# Patient Record
Sex: MALE | Race: WHITE | NOT HISPANIC OR LATINO | Employment: UNEMPLOYED
[De-identification: names, ages, dates, MRNs, and addresses within clinical notes are randomized per-mention and may not be internally consistent; named-entity substitution may affect disease eponyms.]

---

## 2020-03-02 ENCOUNTER — HEALTH MAINTENANCE LETTER (OUTPATIENT)
Age: 35
End: 2020-03-02

## 2020-12-20 ENCOUNTER — HEALTH MAINTENANCE LETTER (OUTPATIENT)
Age: 35
End: 2020-12-20

## 2021-04-18 ENCOUNTER — HEALTH MAINTENANCE LETTER (OUTPATIENT)
Age: 36
End: 2021-04-18

## 2021-10-03 ENCOUNTER — HEALTH MAINTENANCE LETTER (OUTPATIENT)
Age: 36
End: 2021-10-03

## 2022-05-14 ENCOUNTER — HEALTH MAINTENANCE LETTER (OUTPATIENT)
Age: 37
End: 2022-05-14

## 2022-09-04 ENCOUNTER — HEALTH MAINTENANCE LETTER (OUTPATIENT)
Age: 37
End: 2022-09-04

## 2023-06-03 ENCOUNTER — HEALTH MAINTENANCE LETTER (OUTPATIENT)
Age: 38
End: 2023-06-03

## 2024-08-15 SDOH — HEALTH STABILITY: PHYSICAL HEALTH: ON AVERAGE, HOW MANY DAYS PER WEEK DO YOU ENGAGE IN MODERATE TO STRENUOUS EXERCISE (LIKE A BRISK WALK)?: 5 DAYS

## 2024-08-15 SDOH — HEALTH STABILITY: PHYSICAL HEALTH: ON AVERAGE, HOW MANY MINUTES DO YOU ENGAGE IN EXERCISE AT THIS LEVEL?: 60 MIN

## 2024-08-15 ASSESSMENT — SOCIAL DETERMINANTS OF HEALTH (SDOH): HOW OFTEN DO YOU GET TOGETHER WITH FRIENDS OR RELATIVES?: ONCE A WEEK

## 2024-08-16 ENCOUNTER — OFFICE VISIT (OUTPATIENT)
Dept: FAMILY MEDICINE | Facility: CLINIC | Age: 39
End: 2024-08-16
Payer: COMMERCIAL

## 2024-08-16 VITALS
BODY MASS INDEX: 29.78 KG/M2 | WEIGHT: 208 LBS | RESPIRATION RATE: 20 BRPM | SYSTOLIC BLOOD PRESSURE: 170 MMHG | HEART RATE: 85 BPM | DIASTOLIC BLOOD PRESSURE: 110 MMHG | OXYGEN SATURATION: 96 % | TEMPERATURE: 98.5 F | HEIGHT: 70 IN

## 2024-08-16 DIAGNOSIS — K21.9 GASTROESOPHAGEAL REFLUX DISEASE, UNSPECIFIED WHETHER ESOPHAGITIS PRESENT: ICD-10-CM

## 2024-08-16 DIAGNOSIS — R03.0 ELEVATED BLOOD PRESSURE READING WITHOUT DIAGNOSIS OF HYPERTENSION: ICD-10-CM

## 2024-08-16 DIAGNOSIS — Z00.00 ROUTINE GENERAL MEDICAL EXAMINATION AT A HEALTH CARE FACILITY: Primary | ICD-10-CM

## 2024-08-16 DIAGNOSIS — Z13.1 SCREENING FOR DIABETES MELLITUS: ICD-10-CM

## 2024-08-16 LAB
ALBUMIN SERPL BCG-MCNC: 4.8 G/DL (ref 3.5–5.2)
ALP SERPL-CCNC: 120 U/L (ref 40–150)
ALT SERPL W P-5'-P-CCNC: 23 U/L (ref 0–70)
ANION GAP SERPL CALCULATED.3IONS-SCNC: 11 MMOL/L (ref 7–15)
AST SERPL W P-5'-P-CCNC: 17 U/L (ref 0–45)
BILIRUB SERPL-MCNC: 0.6 MG/DL
BUN SERPL-MCNC: 16 MG/DL (ref 6–20)
CALCIUM SERPL-MCNC: 9.4 MG/DL (ref 8.8–10.4)
CHLORIDE SERPL-SCNC: 102 MMOL/L (ref 98–107)
CHOLEST SERPL-MCNC: 264 MG/DL
CREAT SERPL-MCNC: 1.42 MG/DL (ref 0.67–1.17)
EGFRCR SERPLBLD CKD-EPI 2021: 64 ML/MIN/1.73M2
ERYTHROCYTE [DISTWIDTH] IN BLOOD BY AUTOMATED COUNT: 13 % (ref 10–15)
FASTING STATUS PATIENT QL REPORTED: NO
FASTING STATUS PATIENT QL REPORTED: NO
GLUCOSE SERPL-MCNC: 107 MG/DL (ref 70–99)
HBA1C MFR BLD: 5.5 % (ref 0–5.6)
HCO3 SERPL-SCNC: 28 MMOL/L (ref 22–29)
HCT VFR BLD AUTO: 50.9 % (ref 40–53)
HDLC SERPL-MCNC: 49 MG/DL
HGB BLD-MCNC: 16.7 G/DL (ref 13.3–17.7)
LDLC SERPL CALC-MCNC: 163 MG/DL
MCH RBC QN AUTO: 27.2 PG (ref 26.5–33)
MCHC RBC AUTO-ENTMCNC: 32.8 G/DL (ref 31.5–36.5)
MCV RBC AUTO: 83 FL (ref 78–100)
NONHDLC SERPL-MCNC: 215 MG/DL
PLATELET # BLD AUTO: 189 10E3/UL (ref 150–450)
POTASSIUM SERPL-SCNC: 3.6 MMOL/L (ref 3.4–5.3)
PROT SERPL-MCNC: 7.9 G/DL (ref 6.4–8.3)
RBC # BLD AUTO: 6.14 10E6/UL (ref 4.4–5.9)
SODIUM SERPL-SCNC: 141 MMOL/L (ref 135–145)
TRIGL SERPL-MCNC: 261 MG/DL
TSH SERPL DL<=0.005 MIU/L-ACNC: 2.68 UIU/ML (ref 0.3–4.2)
WBC # BLD AUTO: 6.2 10E3/UL (ref 4–11)

## 2024-08-16 PROCEDURE — 80061 LIPID PANEL: CPT | Performed by: FAMILY MEDICINE

## 2024-08-16 PROCEDURE — 99385 PREV VISIT NEW AGE 18-39: CPT | Performed by: FAMILY MEDICINE

## 2024-08-16 PROCEDURE — 85027 COMPLETE CBC AUTOMATED: CPT | Performed by: FAMILY MEDICINE

## 2024-08-16 PROCEDURE — 93000 ELECTROCARDIOGRAM COMPLETE: CPT | Performed by: FAMILY MEDICINE

## 2024-08-16 PROCEDURE — 99214 OFFICE O/P EST MOD 30 MIN: CPT | Mod: 25 | Performed by: FAMILY MEDICINE

## 2024-08-16 PROCEDURE — 36415 COLL VENOUS BLD VENIPUNCTURE: CPT | Performed by: FAMILY MEDICINE

## 2024-08-16 PROCEDURE — 83036 HEMOGLOBIN GLYCOSYLATED A1C: CPT | Performed by: FAMILY MEDICINE

## 2024-08-16 PROCEDURE — 84443 ASSAY THYROID STIM HORMONE: CPT | Performed by: FAMILY MEDICINE

## 2024-08-16 PROCEDURE — 80053 COMPREHEN METABOLIC PANEL: CPT | Performed by: FAMILY MEDICINE

## 2024-08-16 RX ORDER — LISINOPRIL 10 MG/1
10 TABLET ORAL DAILY
Qty: 90 TABLET | Refills: 3 | Status: CANCELLED | OUTPATIENT
Start: 2024-08-16

## 2024-08-16 ASSESSMENT — ANXIETY QUESTIONNAIRES
5. BEING SO RESTLESS THAT IT IS HARD TO SIT STILL: NOT AT ALL
7. FEELING AFRAID AS IF SOMETHING AWFUL MIGHT HAPPEN: NEARLY EVERY DAY
GAD7 TOTAL SCORE: 16
6. BECOMING EASILY ANNOYED OR IRRITABLE: SEVERAL DAYS
IF YOU CHECKED OFF ANY PROBLEMS ON THIS QUESTIONNAIRE, HOW DIFFICULT HAVE THESE PROBLEMS MADE IT FOR YOU TO DO YOUR WORK, TAKE CARE OF THINGS AT HOME, OR GET ALONG WITH OTHER PEOPLE: SOMEWHAT DIFFICULT
3. WORRYING TOO MUCH ABOUT DIFFERENT THINGS: NEARLY EVERY DAY
1. FEELING NERVOUS, ANXIOUS, OR ON EDGE: NEARLY EVERY DAY
2. NOT BEING ABLE TO STOP OR CONTROL WORRYING: NEARLY EVERY DAY
GAD7 TOTAL SCORE: 16

## 2024-08-16 ASSESSMENT — PATIENT HEALTH QUESTIONNAIRE - PHQ9
SUM OF ALL RESPONSES TO PHQ QUESTIONS 1-9: 4
5. POOR APPETITE OR OVEREATING: NEARLY EVERY DAY

## 2024-08-16 ASSESSMENT — PAIN SCALES - GENERAL: PAINLEVEL: MODERATE PAIN (4)

## 2024-08-16 NOTE — PROGRESS NOTES
Preventive Care Visit  [facility]  Noah Smith DO, Family Medicine  Aug 16, 2024      Assessment & Plan     Routine general medical examination at a health care facility    Cholesterol: screen today non-fasting.   Diabetes: screen today   Colon Cancer: no family history of early colon cancer.    Tobacco: non-user.     Screening for diabetes mellitus  Screen today.   - Hemoglobin A1c    Elevated blood pressure reading without diagnosis of hypertension  BP elevated 170/110. Reports being anxious here at the Doctor's. Has not been seen for 11 years. No chest pain or shortnes of breath at rest or with activity. No headaches or dizziness.   -- discussed significantly elevated. Wishes to hold off on medications at this time and check his BP at home and follow up in 3-5 days for recheck. Understands the risks of untreated blood pressure. Reasons to present to ED discussed.   -- EKG in clinic shows NSR consider old anterior infarct.   - Lipid panel reflex to direct LDL Non-fasting  - CBC with platelets  - Comprehensive metabolic panel (BMP + Alb, Alk Phos, ALT, AST, Total. Bili, TP)  - TSH with free T4 reflex  - EKG 12-lead complete w/read - Clinics  - UA Macroscopic with reflex to Microscopic and Culture - Lab Collect  - Albumin Random Urine Quantitative with Creat Ratio    Gastroesophageal reflux disease, unspecified whether esophagitis present  No issues currently. Used to have severe reflux and was on PPI and famotidine. This then stopped a couple years ago without changing diet or lifestyle. Concerning and will further evaluate with EGD once BP is better controlled.   - Adult GI  Referral - Procedure Only      Patient has been advised of split billing requirements and indicates understanding: Yes    The risks, benefits and treatment options of prescribed medications or other treatments have been discussed with the patient. The patient verbalized their understanding and should  "call or follow up if no improvement or if they develop further problems.      BMI  Estimated body mass index is 29.55 kg/m  as calculated from the following:    Height as of this encounter: 1.787 m (5' 10.35\").    Weight as of this encounter: 94.3 kg (208 lb).   Weight management plan: Discussed healthy diet and exercise guidelines    Counseling  Appropriate preventive services were addressed with this patient via screening, questionnaire, or discussion as appropriate for fall prevention, nutrition, physical activity, Tobacco-use cessation, social engagement, weight loss and cognition.  Checklist reviewing preventive services available has been given to the patient.  Reviewed patient's diet, addressing concerns and/or questions.         Kameron Suero is a 39 year old, presenting for the following:  Physical and Knee Pain         Health Care Directive  Patient does not have a Health Care Directive or Living Will: Discussed advance care planning with patient; however, patient declined at this time.    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 Has not been evaluated since 2013.       Cholesterol: screen today non-fasting.   Diabetes: screen today   Colon Cancer: no family history of early colon cancer.    Tobacco: non-user.     Elevated blood pressure.   Mets>4   No chest pain or shortness of breath at rest or with activity.   Asymptomatic.   Stress levels are quite high.   Wife diagnosed with colon cancer recently.   Having quite a bit of stress in his life.   No tobacco   Rare to no alcohol.   Caffeine: a few soda's per day   Father with history of CAD and MI       GERD  Used to be on PPI, Famotidine.   Last EGD when age 12.   Used to have real bad acid reflux.   Now no longer having any acid reflux. Has not changed his diet.             8/16/2024     1:23 PM   PHQ-9 SCORE   PHQ-9 Total Score 4           8/16/2024     1:23 PM   AMADO-7 SCORE   Total Score 16           8/16/2024     " 1:23 PM   PEG Score   PEG Total Score 0.33               8/15/2024   General Health   How would you rate your overall physical health? Good   Feel stress (tense, anxious, or unable to sleep) Very much      (!) STRESS CONCERN      8/15/2024   Nutrition   Three or more servings of calcium each day? (!) NO   Diet: Other   If other, please elaborate: No grains, onions, spicy.   How many servings of fruit and vegetables per day? (!) 2-3   How many sweetened beverages each day? (!) 4+            8/15/2024   Exercise   Days per week of moderate/strenous exercise 5 days   Average minutes spent exercising at this level 60 min            8/15/2024   Social Factors   Frequency of gathering with friends or relatives Once a week   Worry food won't last until get money to buy more No   Food not last or not have enough money for food? No   Do you have housing? (Housing is defined as stable permanent housing and does not include staying ouside in a car, in a tent, in an abandoned building, in an overnight shelter, or couch-surfing.) Yes   Are you worried about losing your housing? No   Lack of transportation? No   Unable to get utilities (heat,electricity)? No            8/15/2024   Dental   Dentist two times every year? Yes            8/15/2024   TB Screening   Were you born outside of the [place]? No            Today's PHQ-2 Score:       8/15/2024     1:50 PM   PHQ-2 ( 1999 [employer])   Q1: Little interest or pleasure in doing things 0   Q2: Feeling down, depressed or hopeless 1   PHQ-2 Score 1   Q1: Little interest or pleasure in doing things Not at all   Q2: Feeling down, depressed or hopeless Several days   PHQ-2 Score 1           8/15/2024   Substance Use   Alcohol more than 3/day or more than 7/wk No   Do you use any other substances recreationally? No        Social History     Tobacco Use    Smoking status: Former     Current packs/day: 0.00     Types: Cigarettes     Start date: 4/22/2000     Quit date: 4/22/2003     Years since  "quittin.3    Smokeless tobacco: Never   Vaping Use    Vaping status: Never Used   Substance Use Topics    Alcohol use: Yes     Alcohol/week: 1.7 - 2.5 standard drinks of alcohol     Types: 2 - 3 drink(s) per week    Drug use: No           8/15/2024   STI Screening   New sexual partner(s) since last STI/HIV test? No            8/15/2024   Contraception/Family Planning   Questions about contraception or family planning No           Reviewed and updated as needed this visit by Provider   Tobacco  Allergies  Meds  Problems  Med Hx  Surg Hx  Fam Hx                  Review of Systems  Constitutional, HEENT, cardiovascular, pulmonary, gi and gu systems are negative, except as otherwise noted.     Objective    Exam  BP (!) 170/110   Pulse 85   Temp 98.5  F (36.9  C)   Resp 20   Ht 1.787 m (5' 10.35\")   Wt 94.3 kg (208 lb)   SpO2 96%   BMI 29.55 kg/m     Estimated body mass index is 29.55 kg/m  as calculated from the following:    Height as of this encounter: 1.787 m (5' 10.35\").    Weight as of this encounter: 94.3 kg (208 lb).    Physical Exam  GENERAL: alert and no distress  EYES: Eyes grossly normal to inspection, PERRL and conjunctivae and sclerae normal  HENT: ear canals and TM's normal, nose and mouth without ulcers or lesions  NECK: no adenopathy, no asymmetry, masses, or scars  RESP: lungs clear to auscultation - no rales, rhonchi or wheezes  CV: regular rate and rhythm, normal S1 S2, no S3 or S4, no murmur, click or rub, no peripheral edema  ABDOMEN: soft, nontender, no hepatosplenomegaly, no masses and bowel sounds normal  MS: no gross musculoskeletal defects noted, no edema  SKIN: no suspicious lesions or rashes  NEURO: Normal strength and tone, mentation intact and speech normal  PSYCH: mentation appears normal, affect normal/bright        Signed Electronically by: Noah Smith,     "

## 2024-08-16 NOTE — NURSING NOTE
"Chief Complaint   Patient presents with    Physical    Knee Pain       Initial Ht 1.787 m (5' 10.35\")   Wt 94.3 kg (208 lb)   BMI 29.55 kg/m   Estimated body mass index is 29.55 kg/m  as calculated from the following:    Height as of this encounter: 1.787 m (5' 10.35\").    Weight as of this encounter: 94.3 kg (208 lb).    Patient presents to the clinic using No DME    Is there anyone who you would like to be able to receive your results? No  If yes have patient fill out SINAN      "

## 2024-08-16 NOTE — PATIENT INSTRUCTIONS
Patient Education     Schedule EGD procedure.     Monitor blood pressure at home.   Check labs today.     Follow up with myself early next week.       HOW TO CHECK YOUR BLOOD PRESSURE AT HOME:      Avoid eating, smoking, and exercising for at least 30 minutes before taking a reading.     Be sure you have taken your BP medication at least 2-3 hours before you check it.      Sit quietly for 10 minutes before a reading.      Sit in a chair with your feet flat on the floor. Rest your  arm on a table so that the arm cuff is at the same level as your heart.     Remain still during the reading.     Record your blood pressure and pulse in a log and bring to your next appointment.        Preventive Care Advice   This is general advice given by our system to help you stay healthy. However, your care team may have specific advice just for you. Please talk to your care team about your preventive care needs.  Nutrition  Eat 5 or more servings of fruits and vegetables each day.  Try wheat bread, brown rice and whole grain pasta (instead of white bread, rice, and pasta).  Get enough calcium and vitamin D. Check the label on foods and aim for 100% of the RDA (recommended daily allowance).  Lifestyle  Exercise at least 150 minutes each week  (30 minutes a day, 5 days a week).  Do muscle strengthening activities 2 days a week. These help control your weight and prevent disease.  No smoking.  Wear sunscreen to prevent skin cancer.  Have a dental exam and cleaning every 6 months.  Yearly exams  See your health care team every year to talk about:  Any changes in your health.  Any medicines your care team has prescribed.  Preventive care, family planning, and ways to prevent chronic diseases.  Shots (vaccines)   HPV shots (up to age 26), if you've never had them before.  Hepatitis B shots (up to age 59), if you've never had them before.  COVID-19 shot: Get this shot when it's due.  Flu shot: Get a flu shot every year.  Tetanus shot: Get  a tetanus shot every 10 years.  Pneumococcal, hepatitis A, and RSV shots: Ask your care team if you need these based on your risk.  Shingles shot (for age 50 and up)  General health tests  Diabetes screening:  Starting at age 35, Get screened for diabetes at least every 3 years.  If you are younger than age 35, ask your care team if you should be screened for diabetes.  Cholesterol test: At age 39, start having a cholesterol test every 5 years, or more often if advised.  Bone density scan (DEXA): At age 50, ask your care team if you should have this scan for osteoporosis (brittle bones).  Hepatitis C: Get tested at least once in your life.  STIs (sexually transmitted infections)  Before age 24: Ask your care team if you should be screened for STIs.  After age 24: Get screened for STIs if you're at risk. You are at risk for STIs (including HIV) if:  You are sexually active with more than one person.  You don't use condoms every time.  You or a partner was diagnosed with a sexually transmitted infection.  If you are at risk for HIV, ask about PrEP medicine to prevent HIV.  Get tested for HIV at least once in your life, whether you are at risk for HIV or not.  Cancer screening tests  Cervical cancer screening: If you have a cervix, begin getting regular cervical cancer screening tests starting at age 21.  Breast cancer scan (mammogram): If you've ever had breasts, begin having regular mammograms starting at age 40. This is a scan to check for breast cancer.  Colon cancer screening: It is important to start screening for colon cancer at age 45.  Have a colonoscopy test every 10 years (or more often if you're at risk) Or, ask your provider about stool tests like a FIT test every year or Cologuard test every 3 years.  To learn more about your testing options, visit:   .  For help making a decision, visit:   https://bit.ly/uv74975.  Prostate cancer screening test: If you have a prostate, ask your care team if a prostate  cancer screening test (PSA) at age 55 is right for you.  Lung cancer screening: If you are a current or former smoker ages 50 to 80, ask your care team if ongoing lung cancer screenings are right for you.  For informational purposes only. Not to replace the advice of your health care provider. Copyright   2023 [facility] [employer]. All rights reserved. Clinically reviewed by the [facility]. [employer] 759059 - REV 01/24.  Learning About Stress  What is stress?     Stress is your body's response to a hard situation. Your body can have a physical, emotional, or mental response. Stress is a fact of life for most people, and it affects everyone differently. What causes stress for you may not be stressful for someone else.  A lot of things can cause stress. You may feel stress when you go on a job interview, take a test, or run a race. This kind of short-term stress is normal and even useful. It can help you if you need to work hard or react quickly. For example, stress can help you finish an important job on time.  Long-term stress is caused by ongoing stressful situations or events. Examples of long-term stress include long-term health problems, ongoing problems at work, or conflicts in your family. Long-term stress can harm your health.  How does stress affect your health?  When you are stressed, your body responds as though you are in danger. It makes hormones that speed up your heart, make you breathe faster, and give you a burst of energy. This is called the fight-or-flight stress response. If the stress is over quickly, your body goes back to normal and no harm is done.  But if stress happens too often or lasts too long, it can have bad effects. Long-term stress can make you more likely to get sick, and it can make symptoms of some diseases worse. If you tense up when you are stressed, you may develop neck, shoulder, or low back pain. Stress is linked to high blood pressure and heart  disease.  Stress also harms your emotional health. It can make you hidalgo, tense, or depressed. Your relationships may suffer, and you may not do well at work or school.  What can you do to manage stress?  You can try these things to help manage stress:   Do something active. Exercise or activity can help reduce stress. Walking is a great way to get started. Even everyday activities such as housecleaning or yard work can help.  Try yoga or alejandra chi. These techniques combine exercise and meditation. You may need some training at first to learn them.  Do something you enjoy. For example, listen to music or go to a movie. Practice your hobby or do volunteer work.  Meditate. This can help you relax, because you are not worrying about what happened before or what may happen in the future.  Do guided imagery. Imagine yourself in any setting that helps you feel calm. You can use online videos, books, or a teacher to guide you.  Do breathing exercises. For example:  From a standing position, bend forward from the waist with your knees slightly bent. Let your arms dangle close to the floor.  Breathe in slowly and deeply as you return to a standing position. Roll up slowly and lift your head last.  Hold your breath for just a few seconds in the standing position.  Breathe out slowly and bend forward from the waist.  Let your feelings out. Talk, laugh, cry, and express anger when you need to. Talking with supportive friends or family, a counselor, or a liam leader about your feelings is a healthy way to relieve stress. Avoid discussing your feelings with people who make you feel worse.  Write. It may help to write about things that are bothering you. This helps you find out how much stress you feel and what is causing it. When you know this, you can find better ways to cope.  What can you do to prevent stress?  You might try some of these things to help prevent stress:  Manage your time. This helps you find time to do the  "things you want and need to do.  Get enough sleep. Your body recovers from the stresses of the day while you are sleeping.  Get support. Your family, friends, and community can make a difference in how you experience stress.  Limit your news feed. Avoid or limit time on social media or news that may make you feel stressed.  Do something active. Exercise or activity can help reduce stress. Walking is a great way to get started.  Where can you learn more?  Go to https://www.[employer].net/patiented  Enter N032 in the search box to learn more about \"Learning About Stress.\"  Current as of: October 24, 2023               Content Version: 14.0    5406-9107 [employer].   Care instructions adapted under license by your healthcare professional. If you have questions about a medical condition or this instruction, always ask your healthcare professional. [employer] disclaims any warranty or liability for your use of this information.         "

## 2024-08-18 PROCEDURE — 81003 URINALYSIS AUTO W/O SCOPE: CPT | Performed by: FAMILY MEDICINE

## 2024-08-19 LAB
ALBUMIN UR-MCNC: NEGATIVE MG/DL
APPEARANCE UR: CLEAR
BILIRUB UR QL STRIP: NEGATIVE
COLOR UR AUTO: YELLOW
CREAT UR-MCNC: 69 MG/DL
GLUCOSE UR STRIP-MCNC: NEGATIVE MG/DL
HGB UR QL STRIP: NEGATIVE
KETONES UR STRIP-MCNC: NEGATIVE MG/DL
LEUKOCYTE ESTERASE UR QL STRIP: NEGATIVE
MICROALBUMIN UR-MCNC: <12 MG/L
MICROALBUMIN/CREAT UR: NORMAL MG/G{CREAT}
NITRATE UR QL: NEGATIVE
PH UR STRIP: 6.5 [PH] (ref 5–7)
SP GR UR STRIP: 1.01 (ref 1–1.03)
UROBILINOGEN UR STRIP-ACNC: 0.2 E.U./DL

## 2024-08-19 PROCEDURE — 82043 UR ALBUMIN QUANTITATIVE: CPT | Performed by: FAMILY MEDICINE

## 2024-08-19 PROCEDURE — 82570 ASSAY OF URINE CREATININE: CPT | Performed by: FAMILY MEDICINE

## 2024-08-20 ENCOUNTER — OFFICE VISIT (OUTPATIENT)
Dept: FAMILY MEDICINE | Facility: CLINIC | Age: 39
End: 2024-08-20
Payer: COMMERCIAL

## 2024-08-20 VITALS
HEIGHT: 72 IN | RESPIRATION RATE: 20 BRPM | TEMPERATURE: 97.6 F | SYSTOLIC BLOOD PRESSURE: 180 MMHG | BODY MASS INDEX: 28.04 KG/M2 | HEART RATE: 89 BPM | OXYGEN SATURATION: 100 % | WEIGHT: 207 LBS | DIASTOLIC BLOOD PRESSURE: 110 MMHG

## 2024-08-20 DIAGNOSIS — R79.89 ELEVATED SERUM CREATININE: ICD-10-CM

## 2024-08-20 DIAGNOSIS — R94.31 ABNORMAL ELECTROCARDIOGRAM: ICD-10-CM

## 2024-08-20 DIAGNOSIS — I10 ESSENTIAL HYPERTENSION: Primary | ICD-10-CM

## 2024-08-20 PROCEDURE — 99213 OFFICE O/P EST LOW 20 MIN: CPT | Mod: 25 | Performed by: FAMILY MEDICINE

## 2024-08-20 PROCEDURE — 90471 IMMUNIZATION ADMIN: CPT | Performed by: FAMILY MEDICINE

## 2024-08-20 PROCEDURE — 90715 TDAP VACCINE 7 YRS/> IM: CPT | Performed by: FAMILY MEDICINE

## 2024-08-20 RX ORDER — AMLODIPINE BESYLATE 5 MG/1
5 TABLET ORAL DAILY
Qty: 90 TABLET | Refills: 3 | Status: SHIPPED | OUTPATIENT
Start: 2024-08-20 | End: 2024-09-04

## 2024-08-20 ASSESSMENT — PAIN SCALES - GENERAL: PAINLEVEL: NO PAIN (0)

## 2024-08-20 NOTE — PROGRESS NOTES
Assessment & Plan     Essential hypertension  New diagnosis  Discussed lifestyle modifications.   Currently asymptomatic.   -- start on amlodipine 5 mg daily. Follow up with nurse BP check in 2 weeks. If remains elevated plan to further increase the amlodipine.   -- recheck BMP in 2 weeks.   -- advised to monitor BP at home.   -- reports having quite a bit of stress/ anxiety and feels this is playing a role as well. Not interested in medications at this time.   - amLODIPine (NORVASC) 5 MG tablet  Dispense: 90 tablet; Refill: 3  - Echocardiogram Complete    Elevated serum creatinine   Plan to recheck in 2 weeks. Encouraged staying well hydrated.   - Basic metabolic panel  (Ca, Cl, CO2, Creat, Gluc, K, Na, BUN)    Abnormal electrocardiogram  EKG with consider old anterior infarct. Proceed with ECHO for further evaluation.   - Echocardiogram Complete      The risks, benefits and treatment options of prescribed medications or other treatments have been discussed with the patient. The patient verbalized their understanding and should call or follow up if no improvement or if they develop further problems.      Kameron Suero is a 39 year old, presenting for the following health issues:  Hypertension        8/20/2024     2:12 PM   Additional Questions   Roomed by Nicole MACHADO   Accompanied by wife, Mildred     History of Present Illness       Hypertension: He presents for follow up of hypertension.  He does check blood pressure  regularly outside of the clinic. Outside blood pressures have been over 140/90. He does not follow a low salt diet.     Vascular Disease:  He presents for follow up of vascular disease.     He never takes nitroglycerin. He is not taking daily aspirin.    He eats 2-3 servings of fruits and vegetables daily.He consumes 7 sweetened beverage(s) daily.He exercises with enough effort to increase his heart rate 30 to 60 minutes per day.  He exercises with enough effort to increase his heart rate 7 days per  "week.   He is taking medications regularly.       39 year old male who presents to clinic for follow up on blood pressure.       HTN  Elevated at home as well.   Asymptomatic.   No chest pain or shortness of breath at rest or with activity.   Has strong family history of elevated blood pressure.   Willing to start on a medication.   Initial lab work showing elevated creatinine at 1.42.     Reviewed recent labs and EKG with patient and spouse today in clinic.     Review of Systems  Constitutional, HEENT, cardiovascular, pulmonary, gi and gu systems are negative, except as otherwise noted.      Objective    BP (!) 170/110 (BP Location: Right arm, Patient Position: Chair, Cuff Size: Adult Regular)   Pulse 89   Temp 97.6  F (36.4  C) (Oral)   Resp 20   Ht 1.816 m (5' 11.5\")   Wt 93.9 kg (207 lb)   SpO2 100%   BMI 28.47 kg/m    Body mass index is 28.47 kg/m .  Physical Exam   General: alert, cooperative, no acute distress   CV: RRR, no murmur  Resp: non-labored breathing, clear to auscultation, no wheezing or rales   Abdomen: Soft, non-tender, no guarding.   Extremities: No peripheral edema, calves non-tender.       Signed Electronically by: Noah Smith DO    "

## 2024-08-20 NOTE — PATIENT INSTRUCTIONS
Start on amlodipine 5 mg daily.     Follow up with a nurse BP check in 2 weeks.     Lab visit in 2 weeks.     Schedule ECHO of the heart.       HOW TO CHECK YOUR BLOOD PRESSURE AT HOME:      Avoid eating, smoking, and exercising for at least 30 minutes before taking a reading.     Be sure you have taken your BP medication at least 2-3 hours before you check it.      Sit quietly for 10 minutes before a reading.      Sit in a chair with your feet flat on the floor. Rest your  arm on a table so that the arm cuff is at the same level as your heart.     Remain still during the reading.     Record your blood pressure and pulse in a log and bring to your next appointment.

## 2024-08-20 NOTE — NURSING NOTE
Prior to immunization administration, verified patients identity using patient s name and date of birth. Please see Immunization Activity for additional information.     Screening Questionnaire for Adult Immunization    Are you sick today?   No   Do you have allergies to medications, food, a vaccine component or latex?   No   Have you ever had a serious reaction after receiving a vaccination?   No   Do you have a long-term health problem with heart, lung, kidney, or metabolic disease (e.g., diabetes), asthma, a blood disorder, no spleen, complement component deficiency, a cochlear implant, or a spinal fluid leak?  Are you on long-term aspirin therapy?   No   Do you have cancer, leukemia, HIV/AIDS, or any other immune system problem?   No   Do you have a parent, brother, or sister with an immune system problem?   No   In the past 3 months, have you taken medications that affect  your immune system, such as prednisone, other steroids, or anticancer drugs; drugs for the treatment of rheumatoid arthritis, Crohn s disease, or psoriasis; or have you had radiation treatments?   No   Have you had a seizure, or a brain or other nervous system problem?   No   During the past year, have you received a transfusion of blood or blood    products, or been given immune (gamma) globulin or antiviral drug?   No   For women: Are you pregnant or is there a chance you could become       pregnant during the next month?   No   Have you received any vaccinations in the past 4 weeks?   No     Immunization questionnaire answers were all negative.      Patient instructed to remain in clinic for 15 minutes afterwards, and to report any adverse reactions.     Screening performed by Nicole Whipple CMA on 8/20/2024 at 2:19 PM.

## 2024-09-04 ENCOUNTER — ALLIED HEALTH/NURSE VISIT (OUTPATIENT)
Dept: FAMILY MEDICINE | Facility: CLINIC | Age: 39
End: 2024-09-04
Payer: COMMERCIAL

## 2024-09-04 ENCOUNTER — TELEPHONE (OUTPATIENT)
Dept: FAMILY MEDICINE | Facility: CLINIC | Age: 39
End: 2024-09-04

## 2024-09-04 ENCOUNTER — LAB (OUTPATIENT)
Dept: LAB | Facility: CLINIC | Age: 39
End: 2024-09-04
Payer: COMMERCIAL

## 2024-09-04 VITALS — SYSTOLIC BLOOD PRESSURE: 160 MMHG | DIASTOLIC BLOOD PRESSURE: 104 MMHG | HEART RATE: 80 BPM

## 2024-09-04 DIAGNOSIS — R79.89 ELEVATED SERUM CREATININE: ICD-10-CM

## 2024-09-04 DIAGNOSIS — I10 ESSENTIAL HYPERTENSION: ICD-10-CM

## 2024-09-04 DIAGNOSIS — I10 ESSENTIAL HYPERTENSION: Primary | ICD-10-CM

## 2024-09-04 LAB
ANION GAP SERPL CALCULATED.3IONS-SCNC: 8 MMOL/L (ref 7–15)
BUN SERPL-MCNC: 14.1 MG/DL (ref 6–20)
CALCIUM SERPL-MCNC: 9.3 MG/DL (ref 8.8–10.4)
CHLORIDE SERPL-SCNC: 103 MMOL/L (ref 98–107)
CREAT SERPL-MCNC: 1.33 MG/DL (ref 0.67–1.17)
EGFRCR SERPLBLD CKD-EPI 2021: 70 ML/MIN/1.73M2
GLUCOSE SERPL-MCNC: 137 MG/DL (ref 70–99)
HCO3 SERPL-SCNC: 28 MMOL/L (ref 22–29)
POTASSIUM SERPL-SCNC: 4.1 MMOL/L (ref 3.4–5.3)
SODIUM SERPL-SCNC: 139 MMOL/L (ref 135–145)

## 2024-09-04 PROCEDURE — 36415 COLL VENOUS BLD VENIPUNCTURE: CPT

## 2024-09-04 PROCEDURE — 99207 PR NO CHARGE NURSE ONLY: CPT

## 2024-09-04 PROCEDURE — 80048 BASIC METABOLIC PNL TOTAL CA: CPT

## 2024-09-04 RX ORDER — AMLODIPINE BESYLATE 10 MG/1
10 TABLET ORAL DAILY
Qty: 90 TABLET | Refills: 3 | Status: SHIPPED | OUTPATIENT
Start: 2024-09-04

## 2024-09-04 NOTE — TELEPHONE ENCOUNTER
Pio Reveles is a 39 year old patient who comes in today for a Blood Pressure check because of new medication.  Patient started amlodipine 5 mg recently.   Vital Signs as repeated by RN today:  /114 initially, then 160/104 on recheck.  Pulse 80.  Patient is taking medication as prescribed.  Patient is tolerating medications well.  Patient is monitoring Blood Pressure at home on occasion.  Reading one week ago was 159/114, then yesterday 168/115.   Current complaints: Patient reports occasional lightheadedness with sudden movements, otherwise tolerating medication well with no s/e nor hypertension symptoms. He was advised to get up slowly and avoid sudden, fast movements while adjusting to medication.    Disposition:  Forwarding to PCP to advise on BP and medication please.  Patient also inquiring BP goal for him to be able to have the EGD?    Mony Clifford RN  [facility]

## 2024-09-04 NOTE — TELEPHONE ENCOUNTER
Patient was notified that he should increase his amlodipine to 10 mg daily, per Dr Smith.  Pt verbalized understanding.

## 2024-09-04 NOTE — TELEPHONE ENCOUNTER
BP remains elevated. Plan to increase amlodipine from 5 mg daily to 10 mg daily.     Follow up as scheduled.

## 2024-09-04 NOTE — PROGRESS NOTES
Pio Reveles is a 39 year old patient who comes in today for a Blood Pressure check because of new medication.  Patient started amlodipine 5 mg recently.   Vital Signs as repeated by RN today:  /114 initially, then 160/104 on recheck.  Pulse 80.  Patient is taking medication as prescribed.  Patient is tolerating medications well.  Patient is monitoring Blood Pressure at home on occasion.  Reading one week ago was 159/114, then yesterday 168/115.   Current complaints: Patient reports occasional lightheadedness with sudden movements, otherwise tolerating medication well with no s/e nor hypertension symptoms. He was advised to get up slowly and avoid sudden, fast movements while adjusting to medication.    Disposition:  Forwarding to PCP to advise on BP and medication please.  Patient also inquiring BP goal for him to be able to have the EGD?    Mony Clifford RN  [facility]

## 2024-09-06 ENCOUNTER — HOSPITAL ENCOUNTER (OUTPATIENT)
Dept: CARDIOLOGY | Facility: CLINIC | Age: 39
Discharge: HOME | End: 2024-09-06
Attending: FAMILY MEDICINE | Admitting: FAMILY MEDICINE
Payer: COMMERCIAL

## 2024-09-06 DIAGNOSIS — I10 ESSENTIAL HYPERTENSION: ICD-10-CM

## 2024-09-06 DIAGNOSIS — R94.31 ABNORMAL ELECTROCARDIOGRAM: ICD-10-CM

## 2024-09-06 LAB — BI-PLANE LVEF ECHO: NORMAL

## 2024-09-06 PROCEDURE — 255N000002 HC RX 255 OP 636: Performed by: FAMILY MEDICINE

## 2024-09-06 PROCEDURE — 999N000208 ECHOCARDIOGRAM COMPLETE

## 2024-09-06 PROCEDURE — 93306 TTE W/DOPPLER COMPLETE: CPT | Mod: 26 | Performed by: INTERNAL MEDICINE

## 2024-09-06 RX ADMIN — HUMAN ALBUMIN MICROSPHERES AND PERFLUTREN 2 ML: 10; .22 INJECTION, SOLUTION INTRAVENOUS at 10:14

## 2024-09-20 ENCOUNTER — OFFICE VISIT (OUTPATIENT)
Dept: FAMILY MEDICINE | Facility: CLINIC | Age: 39
End: 2024-09-20
Payer: COMMERCIAL

## 2024-09-20 VITALS
BODY MASS INDEX: 29.18 KG/M2 | DIASTOLIC BLOOD PRESSURE: 112 MMHG | RESPIRATION RATE: 18 BRPM | WEIGHT: 212.2 LBS | TEMPERATURE: 97.5 F | SYSTOLIC BLOOD PRESSURE: 160 MMHG | HEART RATE: 97 BPM | OXYGEN SATURATION: 99 %

## 2024-09-20 DIAGNOSIS — I10 ESSENTIAL HYPERTENSION: Primary | ICD-10-CM

## 2024-09-20 DIAGNOSIS — R79.89 ELEVATED SERUM CREATININE: ICD-10-CM

## 2024-09-20 DIAGNOSIS — R94.31 ABNORMAL ELECTROCARDIOGRAM: ICD-10-CM

## 2024-09-20 LAB
ANION GAP SERPL CALCULATED.3IONS-SCNC: 10 MMOL/L (ref 7–15)
BUN SERPL-MCNC: 16.7 MG/DL (ref 6–20)
CALCIUM SERPL-MCNC: 9.3 MG/DL (ref 8.8–10.4)
CHLORIDE SERPL-SCNC: 100 MMOL/L (ref 98–107)
CREAT SERPL-MCNC: 1.36 MG/DL (ref 0.67–1.17)
EGFRCR SERPLBLD CKD-EPI 2021: 68 ML/MIN/1.73M2
GLUCOSE SERPL-MCNC: 101 MG/DL (ref 70–99)
HCO3 SERPL-SCNC: 27 MMOL/L (ref 22–29)
POTASSIUM SERPL-SCNC: 3.8 MMOL/L (ref 3.4–5.3)
SODIUM SERPL-SCNC: 137 MMOL/L (ref 135–145)

## 2024-09-20 PROCEDURE — 36415 COLL VENOUS BLD VENIPUNCTURE: CPT | Performed by: FAMILY MEDICINE

## 2024-09-20 PROCEDURE — 99214 OFFICE O/P EST MOD 30 MIN: CPT | Performed by: FAMILY MEDICINE

## 2024-09-20 PROCEDURE — 84244 ASSAY OF RENIN: CPT | Mod: 90 | Performed by: FAMILY MEDICINE

## 2024-09-20 PROCEDURE — 80048 BASIC METABOLIC PNL TOTAL CA: CPT | Performed by: FAMILY MEDICINE

## 2024-09-20 PROCEDURE — 82088 ASSAY OF ALDOSTERONE: CPT | Performed by: FAMILY MEDICINE

## 2024-09-20 PROCEDURE — 99000 SPECIMEN HANDLING OFFICE-LAB: CPT | Performed by: FAMILY MEDICINE

## 2024-09-20 PROCEDURE — G2211 COMPLEX E/M VISIT ADD ON: HCPCS | Performed by: FAMILY MEDICINE

## 2024-09-20 RX ORDER — LISINOPRIL 10 MG/1
10 TABLET ORAL DAILY
Qty: 90 TABLET | Refills: 3 | Status: SHIPPED | OUTPATIENT
Start: 2024-09-20

## 2024-09-20 NOTE — PATIENT INSTRUCTIONS
Continue on amlodipine 10 mg daily.   Continue to monitor blood pressure at home.     Start on lisinopril 10 mg daily.   Follow up with myself in 2 weeks.     Follow up with sleep medicine for consideration of sleep study.     Follow up with Cardiology.       Uitilize coricidin for colds

## 2024-09-20 NOTE — PROGRESS NOTES
Assessment & Plan     Essential hypertension  -- improving while on amlodipine 10 mg daily, however continues to be elevated with home readings average of 141/91. More elevated here in clinic at 160/122. Asymptomatic at this this time. No palpitations. No other identifiable cause for his elevated blood pressure. Will proceed with further secondary workup with sleep study ( stop bang 4), renal artery US, renin and aldosterone evaluation, and refer to Cardiology for abnormal EKG and ECHO with mildly reduced EF of 53% and global hypokinesis.   -- start on lisinopril 10 mg daily. Continue with amlodipine 10 mg daily. Follow up with myself in 2 weeks with recheck of BMP prior to visit.   - US Renal Complete w Arterial Duplex  - Aldosterone Renin Ratio  - Adult Sleep Eval & Management  Referral  - Adult Cardiology Eval  Referral  - lisinopril (ZESTRIL) 10 MG tablet  Dispense: 90 tablet; Refill: 3  - Basic metabolic panel  (Ca, Cl, CO2, Creat, Gluc, K, Na, BUN)  - Basic metabolic panel  (Ca, Cl, CO2, Creat, Gluc, K, Na, BUN)    Abnormal electrocardiogram  - Adult Cardiology Eval  Referral    Elevated serum creatinine  Plan to recheck today. Further evaluate with Renal US.     The risks, benefits and treatment options of prescribed medications or other treatments have been discussed with the patient. The patient verbalized their understanding and should call or follow up if no improvement or if they develop further problems.    The longitudinal plan of care for the diagnosis(es)/condition(s) as documented were addressed during this visit. Due to the added complexity in care, I will continue to support patient in the subsequent management and with ongoing continuity of care.      Kameron Suero is a 39 year old, presenting for the following health issues:  Hypertension      9/20/2024    10:48 AM   Additional Questions   Roomed by QUITA Phelps   Accompanied by Self     HPI       Hypertension  Follow-up  Taking amlodipine 10 mg, and is tolerating well  Do you check your blood pressure regularly outside of the clinic? Yes   Are you following a low salt diet? Yes  monitoring   Are your blood pressures ever more than 140 on the top number (systolic) OR more   than 90 on the bottom number (diastolic), for example 140/90? Unsure, cannot access his results from home but averaging 140s/90s.    Checking blood pressure at home and average blood pressure is 141/91.     Recent head cold had to use dayquil for a couple of days.     No chest pain or shortness of breath at rest or with activity.   No palpitations.  No leg swelling   No issues with lying flat.   No tobacco use   Rare alcohol about once per month.   No other illicit drugs.   Has been trying to limit sodium intake.   Trying to eat more salads.   Has cut down on his soda intake.   Takes ibuprofen 1-2 times per week.   No black licorice.   Continues to have some stress at home and also some anxiety about being coming to the doctors.  Strong family history of hypertension.   Stop bang score of 4.   No prior renal us study.       Review of Systems  Constitutional, HEENT, cardiovascular, pulmonary, gi and gu systems are negative, except as otherwise noted.      Objective    Pulse 97   Temp 97.5  F (36.4  C) (Tympanic)   Wt 96.3 kg (212 lb 3.2 oz)   SpO2 99%   BMI 29.18 kg/m    Body mass index is 29.18 kg/m .  Physical Exam   General: alert, cooperative, no acute distress   CV: RRR, no murmur  Resp: non-labored breathing, clear to auscultation, no wheezing or rales   Abdomen: Soft, non-tender, no guarding.   Extremities: No peripheral edema, calves non-tender.       Signed Electronically by: Noah Smith DO

## 2024-09-24 LAB
ALDOST SERPL-MCNC: 12.9 NG/DL (ref 0–31)
RENIN PLAS-CCNC: 2.8 NG/ML/HR

## 2024-09-25 LAB — ALDOST/RENIN PLAS-RTO: 4.6 {RATIO} (ref 0–25)

## 2024-10-02 ENCOUNTER — TELEPHONE (OUTPATIENT)
Dept: FAMILY MEDICINE | Facility: CLINIC | Age: 39
End: 2024-10-02

## 2024-10-02 ENCOUNTER — ALLIED HEALTH/NURSE VISIT (OUTPATIENT)
Dept: FAMILY MEDICINE | Facility: CLINIC | Age: 39
End: 2024-10-02
Payer: COMMERCIAL

## 2024-10-02 ENCOUNTER — MYC MEDICAL ADVICE (OUTPATIENT)
Dept: FAMILY MEDICINE | Facility: CLINIC | Age: 39
End: 2024-10-02

## 2024-10-02 ENCOUNTER — LAB (OUTPATIENT)
Dept: LAB | Facility: CLINIC | Age: 39
End: 2024-10-02
Payer: COMMERCIAL

## 2024-10-02 VITALS — SYSTOLIC BLOOD PRESSURE: 134 MMHG | DIASTOLIC BLOOD PRESSURE: 88 MMHG | HEART RATE: 84 BPM

## 2024-10-02 DIAGNOSIS — I10 ESSENTIAL HYPERTENSION: Primary | ICD-10-CM

## 2024-10-02 DIAGNOSIS — I10 ESSENTIAL HYPERTENSION: ICD-10-CM

## 2024-10-02 LAB
ANION GAP SERPL CALCULATED.3IONS-SCNC: 12 MMOL/L (ref 7–15)
BUN SERPL-MCNC: 16.6 MG/DL (ref 6–20)
CALCIUM SERPL-MCNC: 9.8 MG/DL (ref 8.8–10.4)
CHLORIDE SERPL-SCNC: 100 MMOL/L (ref 98–107)
CREAT SERPL-MCNC: 1.47 MG/DL (ref 0.67–1.17)
EGFRCR SERPLBLD CKD-EPI 2021: 62 ML/MIN/1.73M2
GLUCOSE SERPL-MCNC: 99 MG/DL (ref 70–99)
HCO3 SERPL-SCNC: 27 MMOL/L (ref 22–29)
POTASSIUM SERPL-SCNC: 4.2 MMOL/L (ref 3.4–5.3)
SODIUM SERPL-SCNC: 139 MMOL/L (ref 135–145)

## 2024-10-02 PROCEDURE — 36415 COLL VENOUS BLD VENIPUNCTURE: CPT

## 2024-10-02 PROCEDURE — 99207 PR NO CHARGE NURSE ONLY: CPT

## 2024-10-02 PROCEDURE — 80048 BASIC METABOLIC PNL TOTAL CA: CPT

## 2024-10-02 NOTE — TELEPHONE ENCOUNTER
Dr Smith,    Please see pts mychart msg with BP readings from 9/26.  Noted pt also sent TE from today & has an appt scheduled for today for RN BP check after his lab appt.    Alicia Curiel RN

## 2024-10-02 NOTE — TELEPHONE ENCOUNTER
Reason for Call:  Appointment Request    Patient requesting this type of appt: Chronic Diease Management/Medication/Follow-Up    Requested provider: Noah Smith    Reason patient unable to be scheduled: Not within requested timeframe    When does patient want to be seen/preferred time: 1-2 weeks    Comments: Spouse is calling on behalf of patient. Requesting to reschedule today's appointment for another day due to family emergency. Open to virtual/telephone appointment if he is able to come in for a nurse visit for blood pressure check.    Could we send this information to you in [place] or would you prefer to receive a phone call?:   Patient would prefer a phone call   Okay to leave a detailed message?: Yes at Other phone number:  [phone]    Call taken on 10/2/2024 at 8:05 AM by Whit Singh

## 2024-10-02 NOTE — TELEPHONE ENCOUNTER
Spoke w pt and informed him I added blood pressure check with RN at 130pm. He is hoping we can maybe fit him in immediately after his lab draw but understands if a little wait.    Mer Liu on 10/2/2024 at 12:00 PM

## 2024-10-02 NOTE — TELEPHONE ENCOUNTER
Spoke w pt he wants confirmation from Dr. Smith that this appointment is OK to be delayed until the end of November or if he should get in with another provider. Recheck after starting blood pressure meds. He will be coming in for the lab work today but does not have time for in clinic appt.    Mer Liu on 10/2/2024 at 11:09 AM

## 2024-10-02 NOTE — TELEPHONE ENCOUNTER
Pio Reveles is a 39 year old patient who comes in today for a Blood Pressure check because of medication change and ongoing blood pressure monitoring. Lisinopril added a couple weeks ago, and patient continues amlodipine.  He also had BMP today, which is still in process.  He was supposed to see PCP today, but cancelled due to life events and the need for a short visit so he can  his children today-see below.   Vital Signs as repeated by RN today:  /96 and 134/88 on recheck.  Pulse 84.   Patient is taking medication as prescribed.  Patient is tolerating medications well.  Patient is monitoring Blood Pressure at home.  When patient checked this morning, it was 151/98 on R arm sitting, then 149/84 on L arm laying down.  These are the only readings he provides.   Current complaints: Patient reports increased stress and anxiety, as his wife has been out of town for her father's heart surgery, so he's been solo with the children, etc.  Also notes a couple episodes of very slight foot swelling. Limited to feet, only when he's been standing for several hours, resolves quickly.  Denies other cardiac symptoms.  He continues to monitor caffeine and salt intake and manage stress in his life.   Disposition:  Forwarding to PCP for recommendations.     Mony Clifford RN  [facility]

## 2024-10-02 NOTE — PROGRESS NOTES
Pio Reveles is a 39 year old patient who comes in today for a Blood Pressure check because of medication change and ongoing blood pressure monitoring. Lisinopril added a couple weeks ago, and patient continues amlodipine.  He also had BMP today, which is still in process.  He was supposed to see PCP today, but cancelled due to life events and the need for a short visit so he can  his children today-see below.   Vital Signs as repeated by RN today:  /96 and 134/88 on recheck.  Pulse 84.   Patient is taking medication as prescribed.  Patient is tolerating medications well.  Patient is monitoring Blood Pressure at home.  When patient checked this morning, it was 151/98 on R arm sitting, then 149/84 on L arm laying down.  These are the only readings he provides.   Current complaints: Patient reports increased stress and anxiety, as his wife has been out of town for her father's heart surgery, so he's been solo with the children, etc.  Also notes a couple episodes of very slight foot swelling. Limited to feet, only when he's been standing for several hours, resolves quickly.  Denies other cardiac symptoms.  He continues to monitor caffeine and salt intake and manage stress in his life.   Disposition:  Forwarding to PCP for recommendations.     Mony Clifford RN  [facility]

## 2024-10-02 NOTE — TELEPHONE ENCOUNTER
Routed to Dr Smith for consideration.  Pt was on the schedule today for BP and lab follow up.  Pt was last seen 9/20/24 for hypertension, started on lisinopril, and advised to return in 2 weeks to see provider and get labs.    Pt has been checking home BP's but the readings are on his wife's phone.  Pt will forward home readings to [facility] for Dr Smith' review asap.    Nicole Ahuja RN

## 2024-10-25 ENCOUNTER — HOSPITAL ENCOUNTER (OUTPATIENT)
Dept: ULTRASOUND IMAGING | Facility: CLINIC | Age: 39
Discharge: HOME | End: 2024-10-25
Attending: FAMILY MEDICINE | Admitting: FAMILY MEDICINE
Payer: COMMERCIAL

## 2024-10-25 DIAGNOSIS — I10 ESSENTIAL HYPERTENSION: ICD-10-CM

## 2024-10-25 PROCEDURE — 76770 US EXAM ABDO BACK WALL COMP: CPT

## 2024-11-08 ENCOUNTER — VIRTUAL VISIT (OUTPATIENT)
Dept: SLEEP MEDICINE | Facility: CLINIC | Age: 39
End: 2024-11-08
Attending: FAMILY MEDICINE
Payer: COMMERCIAL

## 2024-11-08 VITALS — BODY MASS INDEX: 28.28 KG/M2 | WEIGHT: 202 LBS | HEIGHT: 71 IN

## 2024-11-08 DIAGNOSIS — I10 ESSENTIAL HYPERTENSION: Primary | ICD-10-CM

## 2024-11-08 DIAGNOSIS — F51.12 INSUFFICIENT SLEEP SYNDROME: ICD-10-CM

## 2024-11-08 PROCEDURE — 99204 OFFICE O/P NEW MOD 45 MIN: CPT | Mod: 95 | Performed by: PHYSICIAN ASSISTANT

## 2024-11-08 ASSESSMENT — SLEEP AND FATIGUE QUESTIONNAIRES
HOW LIKELY ARE YOU TO NOD OFF OR FALL ASLEEP WHILE LYING DOWN TO REST IN THE AFTERNOON WHEN CIRCUMSTANCES PERMIT: MODERATE CHANCE OF DOZING
HOW LIKELY ARE YOU TO NOD OFF OR FALL ASLEEP IN A CAR, WHILE STOPPED FOR A FEW MINUTES IN TRAFFIC: WOULD NEVER DOZE
HOW LIKELY ARE YOU TO NOD OFF OR FALL ASLEEP WHILE SITTING INACTIVE IN A PUBLIC PLACE: WOULD NEVER DOZE
HOW LIKELY ARE YOU TO NOD OFF OR FALL ASLEEP WHEN YOU ARE A PASSENGER IN A CAR FOR AN HOUR WITHOUT A BREAK: WOULD NEVER DOZE
HOW LIKELY ARE YOU TO NOD OFF OR FALL ASLEEP WHILE SITTING AND READING: MODERATE CHANCE OF DOZING
HOW LIKELY ARE YOU TO NOD OFF OR FALL ASLEEP WHILE SITTING AND TALKING TO SOMEONE: WOULD NEVER DOZE
HOW LIKELY ARE YOU TO NOD OFF OR FALL ASLEEP WHILE WATCHING TV: SLIGHT CHANCE OF DOZING
HOW LIKELY ARE YOU TO NOD OFF OR FALL ASLEEP WHILE SITTING QUIETLY AFTER LUNCH WITHOUT ALCOHOL: WOULD NEVER DOZE

## 2024-11-08 ASSESSMENT — PAIN SCALES - GENERAL: PAINLEVEL_OUTOF10: NO PAIN (0)

## 2024-11-08 NOTE — PATIENT INSTRUCTIONS
Limit caffeine (no more than 3 servings, no later than 3 PM).  Try to obtain more sleep. If you can get 7-7.5 hours routinely and are still sleepy, I would recommend further evaluation.  Use the [employer] meche to record your snoring/breathing in the night. Send me screen shots over [employer].. Sleep in a room by yourself for these recordings. Try to get a few nights worth of samples.

## 2024-11-08 NOTE — PROGRESS NOTES
Virtual Visit Details    Type of service:  Video Visit   Start Time: 2:57 PM   End Time: 3:34 PM    Originating Location (pt. Location): Home    Distant Location (provider location):  Off-site  Platform used for Video Visit: [facility]    Outpatient Sleep Medicine Consultation:      Name: Pio Reveles MRN# [id number]   Age: 39 year old YOB: 1985     Date of Consultation: November 7, 2024  Consultation is requested by: Noah Smith DO  [address] [place] [address] Noah Smith  Primary care provider: Noah Smith       Reason for Sleep Consult:     Pio Reveles is sent by Noah Smith for a sleep consultation regarding HTN.    Patient s Reason for visit  Pio Reveles main reason for visit: (Proxy-Rptd) Constantly tired even when I get a good night sleep  Patient states problem(s) started: (Proxy-Rptd) 2020  Pio Reveles's goals for this visit:             Assessment and Plan:     Summary Sleep Diagnoses and Recommendations:  (I10) Essential hypertension  (primary encounter diagnosis), (F51.12) Insufficient sleep syndrome  Comment: Pio presents at the recommendation of his primary for evaluation of sleep apnea in the setting of HTN.  He also has daytime sleepiness, but attributes that to insufficient sleep. 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 He was not able to say if his energy is better with getting more sleep. His ESS was normal at 5/24. He only naps 1-2 times per week, usually in the evening if he gets a chance to relax. His STOP BANG score is 3 tired, HTN, male gender. I do not have a neck measurement. His wife has never commented that he snores over their 17 year marriage. He falls asleep before her, so thinks she would notice. His BMI is <35 (28), age <50 (39). Given there is no snoring and his tiredness could be explained, I think his risk for apnea is relatively low.  Insufficient sleep could contribute to HTN. He has late caffeine, 4 cans of soda, stopping around 8 PM.  Plan: Limit caffeine (no more than 3 servings, no later than 3 PM).  Try to obtain more sleep. If you can get 7-7.5 hours routinely and are still sleepy, I would recommend further evaluation.  Use the [employer] meche to record your snoring/breathing in the night. Send me screen shots over [employer]. Sleep in a room by yourself for these recordings. Try to get a few nights worth of samples. If he has regular snoring, I would recommend a sleep study.           Comorbid Diagnoses:  Patient Active Problem List   Diagnosis    Essential hypertension        Summary Counseling:    Sleep Testing Reviewed  Obstructive Sleep Apnea Reviewed  Complications of Untreated Sleep Apnea Reviewed      Patient will follow up 3 months after sleep study. We will review the study results over [employer] and initiate treatment before the follow up if indicated.  Bennett Goltz, PA-C      Total time spent reviewing medical records, history and physical examination, review of previous testing and interpretation as well as documentation on this date:51 min    CC: Noah Smith          History of Present Illness:     Pio Reveles presents with concerns of feeling tired and elevated blood pressure. He has 2 young kids, age 5-7. He recognizes that he is short on sleep (5-6 hours) because of his kids and work obligations. He thinks his blood pressure medications may contribute to being tired.     Past Sleep Evaluations: None     SLEEP-WAKE SCHEDULE:     Work/School Days: Patient goes to school/work: (Proxy-Rptd) No   Usually gets into bed at (Proxy-Rptd) 10-11pm to midnight  Takes patient about (Proxy-Rptd) 5 - 10 mins to fall asleep  Has trouble falling asleep (Proxy-Rptd) 0-1 nights per week  Wakes up in the middle of the night (Proxy-Rptd) 2-4 times. Sometimes he sleeps through the night  Wakes up due to (Proxy-Rptd) Use the bathroom;Anxiety.  RRx1, which he attributes to how much he drinks in the evening.   He has trouble falling back asleep (Proxy-Rptd) 4-5 times a week.   It usually takes (Proxy-Rptd) 15 mins to get back to sleep  Patient is usually up at (Proxy-Rptd) 7am, can be 4:30-5 AM if his oldest gets up then.  Uses alarm: (Proxy-Rptd) Yes    Weekends/Non-work Days/All Other Days:  Usually gets into bed at (Proxy-Rptd) 11pm   Takes patient about (Proxy-Rptd) 5-10 mins to fall asleep  Patient is usually up at (Proxy-Rptd) 7am kids keep the same schedule.  Uses alarm: (Proxy-Rptd) Yes    Sleep Need  Patient gets  (Proxy-Rptd) 7-8 hrs sleep on average   Patient thinks he needs about (Proxy-Rptd) 8 hrs sleep    Pio Reveles prefers to sleep in this position(s): (Proxy-Rptd) Side may wake on his back every once in a while.   Patient states they do the following activities in bed: (Proxy-Rptd) Use phone, computer, or tablet 20 min, helps him decompress    Naps  Patient takes a purposeful nap (Proxy-Rptd) 2 times a week and naps are usually (Proxy-Rptd) 1-2 hrs in duration   He feels better after a nap: (Proxy-Rptd) No  He dozes off unintentionally (Proxy-Rptd) 0 days per week. Maybe in the evening if the kids are in bed and the chores are done.  Patient has had a driving accident or near-miss due to sleepiness/drowsiness: (Proxy-Rptd) No      SLEEP DISRUPTIONS:    Breathing/Snoring  Patient snores:(Proxy-Rptd) No  Other people complain about his snoring: (Proxy-Rptd) No she is a sound sleeper, but he does fall asleep before her.  Patient has been told he stops breathing in his sleep:(Proxy-Rptd) No  He has issues with the following: (Proxy-Rptd) Stuffy nose when you wake up;Heartburn or reflux at night. Morning headaches: no. Nocturnal heartburn or reflux: has been resolved in the last 5 years, likely due to a dietary change. Nasal congestion at night: has had chronic sinus issues but that has been better since being on blood pressure  medication.    Movement:  Patient gets pain, discomfort, with an urge to move:  (Proxy-Rptd) No restless legs symptoms  It happens when he is resting:  (Proxy-Rptd) No  It happens more at night:  (Proxy-Rptd) No  Patient has been told he kicks his legs at night:  (Proxy-Rptd) No     Behaviors in Sleep:  Pio Reveles has experienced the following behaviors while sleeping: (Proxy-Rptd) Teeth grinding- if stressed. He has not had a bite guard since highschool, but he felt concerns of choking on it. The bruxing was bad last year, but has not been an issue lately. He had some nightmares/night terrors with amlodipine. That seems to have resolved.  Pt denies sleep talking, sleep walking, and dream enactment behavior. Pt denies sleep paralysis, hypnagogue and cataplexy.       Is there anything else you would like your sleep provider to know:        CAFFEINE AND OTHER SUBSTANCES:    Patient consumes caffeinated beverages per day:  (Proxy-Rptd) 4 cans of soda. He has been trying to wean with his HTN.   Last caffeine use is usually: (Proxy-Rptd) 8pm  List of any prescribed or over the counter stimulants that patient takes:    List of any prescribed or over the counter sleep medication patient takes: (Proxy-Rptd) Blood pressure meds. 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  He is mostly responsible for his kids.     SCALES:    EPWORTH SLEEPINESS SCALE         11/8/2024     9:34 AM    [place] Sleepiness Scale ( NASRA Mcnulty  [id number]<br>ESS - [place]/English - Final version - 21 Nov 07 - [facility] [place].)   Sitting and reading Moderate chance of dozing   Watching TV Slight chance of dozing   Sitting, inactive in a public place  (e.g. a theatre or a meeting) Would never doze   As a passenger in a car for an hour without a break Would never doze   Lying down to rest in the afternoon when circumstances permit Moderate chance of dozing   Sitting and talking to someone Would never doze   Sitting quietly after a lunch without alcohol Would never doze   In a car, while stopped for a few minutes in traffic Would never doze   Paulina Score (MC) 5   Paulina Score (Sleep) 5        Patient-reported         INSOMNIA SEVERITY INDEX (CARINA)          11/7/2024     8:08 PM   Insomnia Severity Index (CARINA)   Difficulty falling asleep 1    Difficulty staying asleep 3    Problems waking up too early 3    How SATISFIED/DISSATISFIED are you with your CURRENT sleep pattern? 3    How NOTICEABLE to others do you think your sleep problem is in terms of impairing the quality of your life? 3    How WORRIED/DISTRESSED are you about your current sleep problem? 3    To what extent do you consider your sleep problem to INTERFERE with your daily functioning (e.g. daytime fatigue, mood, ability to function at work/daily chores, concentration, memory, mood, etc.) CURRENTLY? 3    CARINA Total Score 19        Patient-reported       Guidelines for Scoring/Interpretation:  Total score categories:  0-7 = No clinically significant insomnia   8-14 = Subthreshold insomnia   15-21 = Clinical insomnia (moderate severity)  22-28 = Clinical insomnia (severe)  Used via courtesy of www.Heapealth.va.gov with permission from Stone Leyva PhD., [facility]      STOP BANG         10/2/2024     1:35 PM   STOP BANG Questionnaire (  2008, the American Society of Anesthesiologists, Inc. [employer]   [facility]P [facility]: 134/88         GAD7        8/16/2024     1:23 PM   AMADO-7    1. Feeling nervous, anxious, or on edge 3   2. Not being able to stop or control worrying 3   3. Worrying too much about different things 3   4. Trouble relaxing 3   5. Being so restless that it is  "hard to sit still 0   6. Becoming easily annoyed or irritable 1   7. Feeling afraid, as if something awful might happen 3   AMADO-7 Total Score 16   If you checked any problems, how difficult have they made it for you to do your work, take care of things at home, or get along with other people? Somewhat difficult         CAGE-AID         No data to display                CAGE-AID reprinted with permission from the [facility] Journal, MARIE Polo. and ELIAZAR Helms, \"Conjoint screening questionnaires for alcohol and drug abuse\" [place] Medical Journal 94: 135-140, 1995.      PATIENT HEALTH QUESTIONNAIRE-9 (PHQ - 9)        8/16/2024     1:23 PM   PHQ-9 ([employer])   1.  Little interest or pleasure in doing things 0   2.  Feeling down, depressed, or hopeless 1   3.  Trouble falling or staying asleep, or sleeping too much 0   4.  Feeling tired or having little energy 1   5.  Poor appetite or overeating 0   6.  Feeling bad about yourself - or that you are a failure or have let yourself or your family down 0   7.  Trouble concentrating on things, such as reading the newspaper or watching television 1   8.  Moving or speaking so slowly that other people could have noticed. Or the opposite - being so fidgety or restless that you have been moving around a lot more than usual 1   9.  Thoughts that you would be better off dead, or of hurting yourself in some way 0   PHQ-9 Total Score 4   If you checked off any problems, how difficult have these problems made it for you to do your work, take care of things at home, or get along with other people? Not difficult at all   6.  Feeling bad about yourself 0   7.  Trouble concentrating 1   8.  Moving slowly or restless 1   9.  Suicidal or self-harm thoughts 0   Difficulty at work, home, or with people Not difficult at all       Developed by Oliverio Khan, Margy Garcia, Hansel Shannon and colleagues, with an educational rachel from [employer]. No permission required to " reproduce, translate, display or distribute.        Allergies:    No Known Allergies    Medications:    Current Outpatient Medications   Medication Sig Dispense Refill    amLODIPine (NORVASC) 10 MG tablet Take 1 tablet (10 mg) by mouth daily. 90 tablet 3    lisinopril (ZESTRIL) 10 MG tablet Take 1 tablet (10 mg) by mouth daily. 90 tablet 3       Problem List:  Patient Active Problem List    Diagnosis Date Noted    Essential hypertension 2024     Priority: Medium    Elevated serum creatinine 2024     Priority: Medium    Abnormal electrocardiogram 2024     Priority: Medium    CARDIOVASCULAR SCREENING; LDL GOAL LESS THAN 160 2013     Priority: Medium    GERD (gastroesophageal reflux disease) 2013     Priority: Medium        Past Medical/Surgical History:  Past Medical History:   Diagnosis Date    GERD (gastroesophageal reflux disease)      Past Surgical History:   Procedure Laterality Date    NO HISTORY OF SURGERY      wisdom teeth         Social History:  Social History     Socioeconomic History    Marital status:      Spouse name: Not on file    Number of children: Not on file    Years of education: Not on file    Highest education level: Not on file   Occupational History    Not on file   Tobacco Use    Smoking status: Former     Current packs/day: 0.00     Types: Cigarettes     Start date: 2000     Quit date: 2003     Years since quittin.5    Smokeless tobacco: Never   Vaping Use    Vaping status: Never Used   Substance and Sexual Activity    Alcohol use: Yes     Alcohol/week: 1.7 - 2.5 standard drinks of alcohol     Types: 2 - 3 drink(s) per week    Drug use: No    Sexual activity: Yes     Partners: Female   Other Topics Concern    Not on file   Social History Narrative    Not on file     Social Drivers of Health     Financial Resource Strain: Low Risk  (8/15/2024)    Financial Resource Strain     Within the past 12 months, have you or your family members you  live with been unable to get utilities (heat, electricity) when it was really needed?: No   Food Insecurity: Low Risk  (8/15/2024)    Food Insecurity     Within the past 12 months, did you worry that your food would run out before you got money to buy more?: No     Within the past 12 months, did the food you bought just not last and you didn t have money to get more?: No   Transportation Needs: Low Risk  (8/15/2024)    Transportation Needs     Within the past 12 months, has lack of transportation kept you from medical appointments, getting your medicines, non-medical meetings or appointments, work, or from getting things that you need?: No   Physical Activity: Sufficiently Active (8/15/2024)    Exercise Vital Sign     Days of Exercise per Week: 5 days     Minutes of Exercise per Session: 60 min   Stress: Stress Concern Present (8/15/2024)    [place] [employer] - Occupational Stress Questionnaire     Feeling of Stress : Very much   Social Connections: Unknown (8/15/2024)    Social Connection and Isolation Panel [NHANES]     Frequency of Communication with Friends and Family: Not on file     Frequency of Social Gatherings with Friends and Family: Once a week     Attends Tenriism Services: Not on file     Active Member of Clubs or Organizations: Not on file     Attends Club or Organization Meetings: Not on file     Marital Status: Not on file   Interpersonal Safety: Low Risk  (8/16/2024)    Interpersonal Safety     Do you feel physically and emotionally safe where you currently live?: Yes     Within the past 12 months, have you been hit, slapped, kicked or otherwise physically hurt by someone?: No     Within the past 12 months, have you been humiliated or emotionally abused in other ways by your partner or ex-partner?: No   Housing Stability: Low Risk  (8/15/2024)    Housing Stability     Do you have housing? : Yes     Are you worried about losing your housing?: No       Family History:  Family  "History   Problem Relation Age of Onset    Hypertension Father     Hypertension Paternal Grandfather     Breast Cancer Mother 45    Myocardial Infarction Paternal Grandfather     Alcohol/Drug Father         Alcohol       Review of Systems:  A complete review of systems reviewed by me is negative with the exeption of what has been mentioned in the history of present illness.  In the last TWO WEEKS have you experienced any of the following symptoms?  Fevers: (Proxy-Rptd) No  Night Sweats: (Proxy-Rptd) Yes  Weight Gain: (Proxy-Rptd) No  Pain at Night: (Proxy-Rptd) No  Double Vision: (Proxy-Rptd) No  Changes in Vision: (Proxy-Rptd) No  Difficulty Breathing through Nose: (Proxy-Rptd) No  Sore Throat in Morning: (Proxy-Rptd) Yes  Dry Mouth in the Morning: (Proxy-Rptd) No  Shortness of Breath Lying Flat: (Proxy-Rptd) No  Shortness of Breath With Activity: (Proxy-Rptd) No  Awakening with Shortness of Breath: (Proxy-Rptd) No  Increased Cough: (Proxy-Rptd) No  Heart Racing at Night: (Proxy-Rptd) Yes  Swelling in Feet or Legs: (Proxy-Rptd) Yes  Diarrhea at Night: (Proxy-Rptd) No  Heartburn at Night: (Proxy-Rptd) Yes  Urinating More than Once at Night: (Proxy-Rptd) No  Joint Pains at Night: (Proxy-Rptd) Yes  Headaches in Morning: (Proxy-Rptd) Yes  Weakness in Arms or Legs: (Proxy-Rptd) No  Depressed Mood: (Proxy-Rptd) No  Anxiety: (Proxy-Rptd) Yes     Physical Examination:  Vitals: Ht 1.803 m (5' 11\")   Wt 91.6 kg (202 lb)   BMI 28.17 kg/m               GENERAL APPEARANCE: healthy, alert, no distress, and cooperative  EYES: Eyes grossly normal to inspection  HENT: tongue mildly enlarged, fissured  NECK: no asymmetry, masses, or scars  RESP: no apparent respiratory distress (retractions or difficulty speaking in full sentences), no audible wheeze or cough   Mallampati Class: III.  Tonsillar Stage: not able to observe over video.         Data: All pertinent previous laboratory data reviewed     Recent Labs   Lab Test " "10/02/24  1310 09/20/24  1137    137   POTASSIUM 4.2 3.8   CHLORIDE 100 100   CO2 27 27   ANIONGAP 12 10   GLC 99 101*   BUN 16.6 16.7   CR 1.47* 1.36*   PATRICK 9.8 9.3       Recent Labs   Lab Test 08/16/24  1428   WBC 6.2   RBC 6.14*   HGB 16.7   HCT 50.9   MCV 83   MCH 27.2   MCHC 32.8   RDW 13.0          Recent Labs   Lab Test 08/16/24  1428   PROTTOTAL 7.9   ALBUMIN 4.8   BILITOTAL 0.6   ALKPHOS 120   AST 17   ALT 23       TSH (uIU/mL)   Date Value   08/16/2024 2.68       No results found for: \"UAMP\", \"UBARB\", \"BENZODIAZEUR\", \"UCANN\", \"UCOC\", \"OPIT\", \"UPCP\"    No results found for: \"IRONSAT\", \"[id number]\", \"MILTON\"    No results found for: \"PH\", \"PHARTERIAL\", \"PO2\", \"[id number]\", \"SAT\", \"PCO2\", \"HCO3\", \"BASEEXCESS\", \"HUANG\", \"BEB\"    @LABRCNTIPR(phv:4,pco2v:4,po2v:4,hco3v:4,keith:4,o2per:4)@    Echocardiology: No results found for this or any previous visit (from the past 4320 hours).    Chest x-ray: No results found for this or any previous visit from the past 365 days.      Chest CT: No results found for this or any previous visit from the past 365 days.      PFT: Most Recent [facility] Pulmonary Function Testing    No results found for: \"20001\"        Bennett Ezra Goltz, PA-C, MARIANN 11/7/2024          "

## 2024-11-08 NOTE — NURSING NOTE
Current patient location: [address] [place] [id number]    Is the patient currently in the state of [place]? YES    Visit mode:VIDEO    If the visit is dropped, the patient can be reconnected by: VIDEO VISIT: Send to e-mail at: [email or web address]    Will anyone else be joining the visit? YES: How would they like to receive their invitation? Text to cell phone: will join with patient   (If patient encounters technical issues they should call [phone])    Are changes needed to the allergy or medication list? No    Are refills needed on medications prescribed by this physician? NO    Rooming Documentation:  Questionnaire(s) completed    Reason for visit: Consult    Sylvie CHAMBERS

## 2024-11-13 ENCOUNTER — VIRTUAL VISIT (OUTPATIENT)
Dept: FAMILY MEDICINE | Facility: CLINIC | Age: 39
End: 2024-11-13
Payer: COMMERCIAL

## 2024-11-13 DIAGNOSIS — I10 ESSENTIAL HYPERTENSION: Primary | ICD-10-CM

## 2024-11-13 PROCEDURE — 99213 OFFICE O/P EST LOW 20 MIN: CPT | Mod: 95 | Performed by: FAMILY MEDICINE

## 2024-11-13 NOTE — PROGRESS NOTES
Pio is a 39 year old who is being evaluated via a billable video visit.    How would you like to obtain your AVS? MyChart  If the video visit is dropped, the invitation should be resent by: Text to cell phone: [phone]  Will anyone else be joining your video visit? No      Assessment & Plan     Essential hypertension  Reports side effects to his Lisinopril medication. ( Cold fingers, night terrors, ankle swelling) Did not have this prior to starting on lisinopril when he was on amlodipine alone.   -- wishes to stop the lisinopril and monitor BP at home while on the amlodipine 10 mg daily monotherapy. Discussed adding another agent since stopping the lisinopril but wishes to try monotherapy and lifestyle for the next few weeks.  -- advised to monitor BP at home.   -- follow up in 2 weeks for recheck. Appt scheduled.       The risks, benefits and treatment options of prescribed medications or other treatments have been discussed with the patient. The patient verbalized their understanding and should call or follow up if no improvement or if they develop further problems.        Subjective   Pio is a 39 year old, presenting for the following health issues:  Hypertension        11/13/2024     9:02 AM   Additional Questions   Roomed by pilo   Accompanied by self     History of Present Illness       Hypertension: He presents for follow up of hypertension.  He does check blood pressure  regularly outside of the clinic. Outside blood pressures have been over 140/90. He follows a low salt diet.     He eats 2-3 servings of fruits and vegetables daily.He consumes 3 sweetened beverage(s) daily.He exercises with enough effort to increase his heart rate 60 or more minutes per day.  He exercises with enough effort to increase his heart rate 7 days per week.   He is taking medications regularly.     Having night terrors, sweating at night , cold finger tips, swelling in ankles, noticed since he started lisinopril      HTN  Amlodipine 10 mg daily.   Lisinopril 10 mg daily.   Issues started after he started on the lisinopril ( cold finger tips, night terrors  Wishes to stop the lisinopril medication and monitor blood pressure at home.         Review of Systems  Constitutional, HEENT, cardiovascular, pulmonary, gi and gu systems are negative, except as otherwise noted.      Objective           Vitals:  No vitals were obtained today due to virtual visit.    Physical Exam   GENERAL: alert and no distress  EYES: Eyes grossly normal to inspection.  No discharge or erythema, or obvious scleral/conjunctival abnormalities.  RESP: No audible wheeze, cough, or visible cyanosis.    SKIN: Visible skin clear. No significant rash, abnormal pigmentation or lesions.  NEURO: Cranial nerves grossly intact.  Mentation and speech appropriate for age.  PSYCH: Appropriate affect, tone, and pace of words          Video-Visit Details    Type of service:  Video Visit   Originating Location (pt. Location): Home    Distant Location (provider location):  On-site  Platform used for Video Visit: [employer]  Signed Electronically by: Noah Smith DO

## 2024-11-13 NOTE — PATIENT INSTRUCTIONS
Stop the lisinopril.   Continue on the amlodipine.     Follow up in person in 3 weeks.   Monitor blood pressures at home.

## 2024-11-14 NOTE — TELEPHONE ENCOUNTER
See recent MyChart message. Pt had a virtual appt with PCP yesterday, and BP med was addressed in that visit. See progress notes.     Leanne Link RN  [facility]

## 2024-12-04 ENCOUNTER — OFFICE VISIT (OUTPATIENT)
Dept: FAMILY MEDICINE | Facility: CLINIC | Age: 39
End: 2024-12-04
Payer: COMMERCIAL

## 2024-12-04 VITALS
DIASTOLIC BLOOD PRESSURE: 100 MMHG | HEIGHT: 71 IN | OXYGEN SATURATION: 98 % | BODY MASS INDEX: 30.1 KG/M2 | WEIGHT: 215 LBS | HEART RATE: 94 BPM | SYSTOLIC BLOOD PRESSURE: 156 MMHG | TEMPERATURE: 98.4 F | RESPIRATION RATE: 16 BRPM

## 2024-12-04 DIAGNOSIS — R79.89 ELEVATED SERUM CREATININE: ICD-10-CM

## 2024-12-04 DIAGNOSIS — I10 ESSENTIAL HYPERTENSION: Primary | ICD-10-CM

## 2024-12-04 PROCEDURE — 99214 OFFICE O/P EST MOD 30 MIN: CPT | Performed by: FAMILY MEDICINE

## 2024-12-04 PROCEDURE — G2211 COMPLEX E/M VISIT ADD ON: HCPCS | Performed by: FAMILY MEDICINE

## 2024-12-04 RX ORDER — LOSARTAN POTASSIUM 50 MG/1
50 TABLET ORAL DAILY
Qty: 90 TABLET | Refills: 3 | Status: SHIPPED | OUTPATIENT
Start: 2024-12-04

## 2024-12-04 ASSESSMENT — PAIN SCALES - GENERAL: PAINLEVEL_OUTOF10: NO PAIN (0)

## 2024-12-04 NOTE — PATIENT INSTRUCTIONS
Start on losartan 50 mg daily.   Continue on amlodipine 10 mg daily.     Monitor blood pressure at home.     Follow up with myself in 3 weeks.     Blood pressure goal is less than 140/90     HOW TO CHECK YOUR BLOOD PRESSURE AT HOME:      Avoid eating, smoking, and exercising for at least 30 minutes before taking a reading.     Be sure you have taken your BP medication at least 2-3 hours before you check it.      Sit quietly for 10 minutes before a reading.      Sit in a chair with your feet flat on the floor. Rest your  arm on a table so that the arm cuff is at the same level as your heart.     Remain still during the reading.     Record your blood pressure and pulse in a log and bring to your next appointment.

## 2024-12-04 NOTE — PROGRESS NOTES
Assessment & Plan     Essential hypertension  Currently amlodipine 10 mg daily.  Blood pressure is elevated here in clinic.  Did not tolerate lisinopril.  Plan to start on losartan 50 mg daily.  Monitor blood pressure at home.  Discussed lifestyle modifications.  Follow up myself in 3 weeks.  BMP prior to follow-up.  - Basic metabolic panel  (Ca, Cl, CO2, Creat, Gluc, K, Na, BUN)  - losartan (COZAAR) 50 MG tablet  Dispense: 90 tablet; Refill: 3    Elevated serum creatinine  Plan to recheck BMP.       The risks, benefits and treatment options of prescribed medications or other treatments have been discussed with the patient. The patient verbalized their understanding and should call or follow up if no improvement or if they develop further problems.    The longitudinal plan of care for the diagnosis(es)/condition(s) as documented were addressed during this visit. Due to the added complexity in care, I will continue to support patient in the subsequent management and with ongoing continuity of care.          Kameron Suero is a 39 year old, presenting for the following health issues:  Hypertension        12/4/2024     1:04 PM   Additional Questions   Roomed by pilo   Accompanied by self     History of Present Illness       Hypertension: He presents for follow up of hypertension.  He does check blood pressure  regularly outside of the clinic. Outside blood pressures have been over 140/90. He follows a low salt diet.     He eats 2-3 servings of fruits and vegetables daily.He consumes 3 sweetened beverage(s) daily.He exercises with enough effort to increase his heart rate 30 to 60 minutes per day.  He exercises with enough effort to increase his heart rate 7 days per week.   He is taking medications regularly.       HTN  Amlodipine 10 mg daily.   Did not tolerate the lisinopril medication. ( Cold fingers, night terrors)   Reports doing better since stopping the lisinopril medication.   Checking blood pressure at  "home. Typically in the 150's over 90's range.   Has decreased caffeine intake   No chest pain or shortness of breath at rest or with activity.         Review of Systems  Constitutional, HEENT, cardiovascular, pulmonary, gi and gu systems are negative, except as otherwise noted.      Objective    BP (!) 156/100   Pulse 94   Temp 98.4  F (36.9  C) (Tympanic)   Resp 16   Ht 1.803 m (5' 11\")   Wt 97.5 kg (215 lb)   SpO2 98%   BMI 29.99 kg/m    Body mass index is 29.99 kg/m .  Physical Exam   General: alert, cooperative, no acute distress   CV: RRR, no murmur  Resp: non-labored breathing, clear to auscultation, no wheezing or rales   Abdomen: Soft, non-tender, no guarding.   Extremities: No peripheral edema, calves non-tender.     Signed Electronically by: Noah Smith,     "

## 2025-01-07 ENCOUNTER — MYC REFILL (OUTPATIENT)
Dept: FAMILY MEDICINE | Facility: CLINIC | Age: 40
End: 2025-01-07
Payer: COMMERCIAL

## 2025-01-07 DIAGNOSIS — I10 ESSENTIAL HYPERTENSION: ICD-10-CM

## 2025-01-09 RX ORDER — AMLODIPINE BESYLATE 10 MG/1
10 TABLET ORAL DAILY
Qty: 90 TABLET | Refills: 3 | OUTPATIENT
Start: 2025-01-09

## 2025-01-20 ENCOUNTER — LAB (OUTPATIENT)
Dept: LAB | Facility: CLINIC | Age: 40
End: 2025-01-20
Payer: COMMERCIAL

## 2025-01-20 ENCOUNTER — OFFICE VISIT (OUTPATIENT)
Dept: FAMILY MEDICINE | Facility: CLINIC | Age: 40
End: 2025-01-20
Payer: COMMERCIAL

## 2025-01-20 VITALS
BODY MASS INDEX: 29.54 KG/M2 | HEIGHT: 71 IN | TEMPERATURE: 97.6 F | HEART RATE: 71 BPM | DIASTOLIC BLOOD PRESSURE: 88 MMHG | SYSTOLIC BLOOD PRESSURE: 138 MMHG | RESPIRATION RATE: 18 BRPM | OXYGEN SATURATION: 99 % | WEIGHT: 211 LBS

## 2025-01-20 DIAGNOSIS — I10 ESSENTIAL HYPERTENSION: ICD-10-CM

## 2025-01-20 LAB
ANION GAP SERPL CALCULATED.3IONS-SCNC: 10 MMOL/L (ref 7–15)
BUN SERPL-MCNC: 20.9 MG/DL (ref 6–20)
CALCIUM SERPL-MCNC: 9.1 MG/DL (ref 8.8–10.4)
CHLORIDE SERPL-SCNC: 103 MMOL/L (ref 98–107)
CREAT SERPL-MCNC: 1.3 MG/DL (ref 0.67–1.17)
EGFRCR SERPLBLD CKD-EPI 2021: 72 ML/MIN/1.73M2
GLUCOSE SERPL-MCNC: 109 MG/DL (ref 70–99)
HCO3 SERPL-SCNC: 25 MMOL/L (ref 22–29)
POTASSIUM SERPL-SCNC: 4.1 MMOL/L (ref 3.4–5.3)
SODIUM SERPL-SCNC: 138 MMOL/L (ref 135–145)

## 2025-01-20 PROCEDURE — 91320 SARSCV2 VAC 30MCG TRS-SUC IM: CPT | Performed by: FAMILY MEDICINE

## 2025-01-20 PROCEDURE — G2211 COMPLEX E/M VISIT ADD ON: HCPCS | Performed by: FAMILY MEDICINE

## 2025-01-20 PROCEDURE — 36415 COLL VENOUS BLD VENIPUNCTURE: CPT

## 2025-01-20 PROCEDURE — 99213 OFFICE O/P EST LOW 20 MIN: CPT | Mod: 25 | Performed by: FAMILY MEDICINE

## 2025-01-20 PROCEDURE — 80048 BASIC METABOLIC PNL TOTAL CA: CPT

## 2025-01-20 PROCEDURE — 90480 ADMN SARSCOV2 VAC 1/ONLY CMP: CPT | Performed by: FAMILY MEDICINE

## 2025-01-20 RX ORDER — LOSARTAN POTASSIUM 100 MG/1
100 TABLET ORAL DAILY
Qty: 90 TABLET | Refills: 3 | Status: SHIPPED | OUTPATIENT
Start: 2025-01-20

## 2025-01-20 ASSESSMENT — PAIN SCALES - GENERAL: PAINLEVEL_OUTOF10: NO PAIN (0)

## 2025-01-20 NOTE — PROGRESS NOTES
Assessment & Plan     Essential hypertension  Current regimen:  Losartan 100 mg daily.   Amlodipine 10 mg daily.   -- tolerating medications. Has some mild leg swelling at times after prolonged standing.   -- monitoring blood pressure at home, encouraged he continue with this.   -- asymptomatic.   -- continue current cares. Follow up yearly or sooner if needed.   - losartan (COZAAR) 100 MG tablet  Dispense: 90 tablet; Refill: 3      The risks, benefits and treatment options of prescribed medications or other treatments have been discussed with the patient. The patient verbalized their understanding and should call or follow up if no improvement or if they develop further problems.    The longitudinal plan of care for the diagnosis(es)/condition(s) as documented were addressed during this visit. Due to the added complexity in care, I will continue to support patient in the subsequent management and with ongoing continuity of care.      Kameron Suero is a 39 year old, presenting for the following health issues:  Hypertension        1/20/2025    10:11 AM   Additional Questions   Roomed by Nicole MACHADO     History of Present Illness       Hypertension: He presents for follow up of hypertension.  He does check blood pressure  regularly outside of the clinic. Outside blood pressures have been over 140/90. He follows a low salt diet.     He eats 0-1 servings of fruits and vegetables daily.He consumes 2 sweetened beverage(s) daily.He exercises with enough effort to increase his heart rate 10 to 19 minutes per day.  He exercises with enough effort to increase his heart rate 5 days per week.   He is taking medications regularly.       HTN   Losartan 100 mg daily.   Amlodipine 10 mg daily.   Checking BP at home and typically within goal range. Occasionally will have some higher blood pressures at time.   No chest pain or shortness of breath at rest or with activity.   Occasionally will have some ankle swelling. Reports not  "real bothersome and not to the point where wants to change up his meds. Discussed likely from amodipine.   Off and on symptoms of joint pain. No issues today.           Review of Systems  Constitutional, HEENT, cardiovascular, pulmonary, gi and gu systems are negative, except as otherwise noted.      Objective    /88   Pulse 71   Temp 97.6  F (36.4  C) (Oral)   Resp 18   Ht 1.803 m (5' 11\")   Wt 95.7 kg (211 lb)   SpO2 99%   BMI 29.43 kg/m    Body mass index is 29.43 kg/m .  Physical Exam   General: alert, cooperative, no acute distress   CV: RRR, no murmur  Resp: non-labored breathing, clear to auscultation, no wheezing or rales   Extremities: trace peripheral edema, calves non-tender.     Signed Electronically by: Noah Smith DO    "

## 2025-01-20 NOTE — PATIENT INSTRUCTIONS
Continue on amlodipine 10 mg daily.     Continue on losartan 100 mg daily.     Continue to monitor blood pressure at home.     Follow up yearly or sooner if needed.

## 2025-03-31 ENCOUNTER — OFFICE VISIT (OUTPATIENT)
Dept: FAMILY MEDICINE | Facility: CLINIC | Age: 40
End: 2025-03-31
Payer: COMMERCIAL

## 2025-03-31 VITALS
WEIGHT: 212 LBS | BODY MASS INDEX: 29.68 KG/M2 | HEART RATE: 80 BPM | OXYGEN SATURATION: 99 % | SYSTOLIC BLOOD PRESSURE: 140 MMHG | HEIGHT: 71 IN | DIASTOLIC BLOOD PRESSURE: 88 MMHG | RESPIRATION RATE: 18 BRPM | TEMPERATURE: 97.9 F

## 2025-03-31 DIAGNOSIS — R20.9 COLD HANDS: ICD-10-CM

## 2025-03-31 DIAGNOSIS — I10 ESSENTIAL HYPERTENSION: Primary | ICD-10-CM

## 2025-03-31 DIAGNOSIS — M25.50 MULTIPLE JOINT PAIN: ICD-10-CM

## 2025-03-31 LAB
CRP SERPL-MCNC: 4.34 MG/L
RHEUMATOID FACT SERPL-ACNC: <10 IU/ML
URATE SERPL-MCNC: 6.2 MG/DL (ref 3.4–7)

## 2025-03-31 PROCEDURE — 36415 COLL VENOUS BLD VENIPUNCTURE: CPT | Performed by: FAMILY MEDICINE

## 2025-03-31 PROCEDURE — 86200 CCP ANTIBODY: CPT | Performed by: FAMILY MEDICINE

## 2025-03-31 PROCEDURE — 3077F SYST BP >= 140 MM HG: CPT | Performed by: FAMILY MEDICINE

## 2025-03-31 PROCEDURE — 86431 RHEUMATOID FACTOR QUANT: CPT | Performed by: FAMILY MEDICINE

## 2025-03-31 PROCEDURE — G2211 COMPLEX E/M VISIT ADD ON: HCPCS | Performed by: FAMILY MEDICINE

## 2025-03-31 PROCEDURE — 86140 C-REACTIVE PROTEIN: CPT | Performed by: FAMILY MEDICINE

## 2025-03-31 PROCEDURE — 3079F DIAST BP 80-89 MM HG: CPT | Performed by: FAMILY MEDICINE

## 2025-03-31 PROCEDURE — 99214 OFFICE O/P EST MOD 30 MIN: CPT | Performed by: FAMILY MEDICINE

## 2025-03-31 PROCEDURE — 1125F AMNT PAIN NOTED PAIN PRSNT: CPT | Performed by: FAMILY MEDICINE

## 2025-03-31 PROCEDURE — 84550 ASSAY OF BLOOD/URIC ACID: CPT | Performed by: FAMILY MEDICINE

## 2025-03-31 RX ORDER — AMLODIPINE BESYLATE 10 MG/1
10 TABLET ORAL DAILY
Qty: 90 TABLET | Refills: 3 | Status: SHIPPED | OUTPATIENT
Start: 2025-03-31

## 2025-03-31 RX ORDER — OLMESARTAN MEDOXOMIL 40 MG/1
40 TABLET ORAL DAILY
Status: CANCELLED | OUTPATIENT
Start: 2025-03-31

## 2025-03-31 ASSESSMENT — PAIN SCALES - GENERAL: PAINLEVEL_OUTOF10: MILD PAIN (2)

## 2025-03-31 NOTE — PATIENT INSTRUCTIONS
Continue on amlodipine 10 mg daily.   Continue on losartan 100 mg daily.     Follow up in May after your trip.     Continue to monitor your blood pressure at home.

## 2025-03-31 NOTE — PROGRESS NOTES
Assessment & Plan     Essential hypertension  Currently on losartan 100 mg daily and amlodipine 10 mg daily. BP slightly elevated at home and here in clinic. Not interested in making medication changes at this time [place]. Plans to monitor at home and follow up in 6 weeks for recheck and discuss medications at that time.   - amLODIPine (NORVASC) 10 MG tablet  Dispense: 90 tablet; Refill: 3    Multiple joint pain  Mostly in the hands and also elbow at time. No swelling or erythema. Rule our RA, gout. Check inflammatory marker.   - Uric acid  - Rheumatoid factor  - Cyclic Citrullinated Peptide Antibody IgG  - CRP, inflammation    Cold hands  -- etiology unknown at this time. He thinks related to his losartan medication. Will plan to continue to monitor at this time and follow up in 6-8 weeks.       The risks, benefits and treatment options of prescribed medications or other treatments have been discussed with the patient. The patient verbalized their understanding and should call or follow up if no improvement or if they develop further problems.    The longitudinal plan of care for the diagnosis(es)/condition(s) as documented were addressed during this visit. Due to the added complexity in care, I will continue to support patient in the subsequent management and with ongoing continuity of care.    Kameron Suero is a 40 year old, presenting for the following health issues:  Hypertension      3/31/2025    11:20 AM   Additional Questions   Roomed by Nicole MACHADO     History of Present Illness       Hypertension: He presents for follow up of hypertension.  He does check blood pressure  regularly outside of the clinic. Outside blood pressures have been over 140/90. He follows a low salt diet.     He eats 0-1 servings of fruits and vegetables daily.He consumes 3 sweetened beverage(s) daily.He exercises with enough effort to increase his heart rate 10 to 19 minutes per day.  He exercises  "with enough effort to increase his heart rate 4 days per week.   He is taking medications regularly.          Typically at home in the 138/88 range. Occasionally will get higher readings at home.   Reports will increase with stress. Plans to work with psychiatry about his stress.       Joint pain  Cold hands.   Hand getting cold and achy   Joint pain in the hands and also the elbows at times. Notices this if his hand are above his heart for prolonged period of oscar.    Reports this started after being started on losartan.   Has been trying to eat healthy   Has been trying to minimize his caffeine intake.   No weakness  No numbness or tingling.   No erythema      Review of Systems  Constitutional, HEENT, cardiovascular, pulmonary, gi and gu systems are negative, except as otherwise noted.      Objective    BP (!) 160/110 (BP Location: Right arm, Patient Position: Chair, Cuff Size: Adult Regular)   Pulse 80   Temp 97.9  F (36.6  C) (Oral)   Resp 18   Ht 1.803 m (5' 11\")   Wt 96.2 kg (212 lb)   SpO2 99%   BMI 29.57 kg/m    Body mass index is 29.57 kg/m .  Physical Exam   General: alert, cooperative, no acute distress   CV: RRR, no murmur  Resp: non-labored breathing, clear to auscultation, no wheezing or rales   Hands: no swelling, erythema, deformity. Non-tender to palpation. Sensation intact.  strength strong. Capillary refill less than 2 seconds. Radial pulse strong. ROM and strength full in the digits. Tinel and phalen testing negative.     Signed Electronically by: Noah Smith DO    "

## 2025-04-02 LAB — CCP AB SER IA-ACNC: 1.4 U/ML

## 2025-05-14 ENCOUNTER — OFFICE VISIT (OUTPATIENT)
Dept: FAMILY MEDICINE | Facility: CLINIC | Age: 40
End: 2025-05-14
Payer: COMMERCIAL

## 2025-05-14 VITALS
DIASTOLIC BLOOD PRESSURE: 98 MMHG | WEIGHT: 214 LBS | HEART RATE: 73 BPM | RESPIRATION RATE: 18 BRPM | TEMPERATURE: 97.7 F | HEIGHT: 71 IN | OXYGEN SATURATION: 98 % | BODY MASS INDEX: 29.96 KG/M2 | SYSTOLIC BLOOD PRESSURE: 138 MMHG

## 2025-05-14 DIAGNOSIS — I10 ESSENTIAL HYPERTENSION: Primary | ICD-10-CM

## 2025-05-14 PROCEDURE — 99214 OFFICE O/P EST MOD 30 MIN: CPT | Performed by: FAMILY MEDICINE

## 2025-05-14 PROCEDURE — 3075F SYST BP GE 130 - 139MM HG: CPT | Performed by: FAMILY MEDICINE

## 2025-05-14 PROCEDURE — 3080F DIAST BP >= 90 MM HG: CPT | Performed by: FAMILY MEDICINE

## 2025-05-14 PROCEDURE — 1126F AMNT PAIN NOTED NONE PRSNT: CPT | Performed by: FAMILY MEDICINE

## 2025-05-14 PROCEDURE — G2211 COMPLEX E/M VISIT ADD ON: HCPCS | Performed by: FAMILY MEDICINE

## 2025-05-14 RX ORDER — AMLODIPINE BESYLATE 5 MG/1
5 TABLET ORAL DAILY
Qty: 90 TABLET | Refills: 3 | Status: SHIPPED | OUTPATIENT
Start: 2025-05-14

## 2025-05-14 RX ORDER — GABAPENTIN 100 MG/1
100 CAPSULE ORAL 2 TIMES DAILY
COMMUNITY
Start: 2025-05-07

## 2025-05-14 RX ORDER — HYDROXYZINE HYDROCHLORIDE 10 MG/1
10 TABLET, FILM COATED ORAL PRN
COMMUNITY
Start: 2025-04-22

## 2025-05-14 RX ORDER — HYDROCHLOROTHIAZIDE 12.5 MG/1
12.5 TABLET ORAL DAILY
Qty: 90 TABLET | Refills: 3 | Status: SHIPPED | OUTPATIENT
Start: 2025-05-14

## 2025-05-14 RX ORDER — OLMESARTAN MEDOXOMIL 40 MG/1
40 TABLET ORAL DAILY
Qty: 90 TABLET | Refills: 3 | Status: SHIPPED | OUTPATIENT
Start: 2025-05-14

## 2025-05-14 ASSESSMENT — PAIN SCALES - GENERAL: PAINLEVEL_OUTOF10: NO PAIN (0)

## 2025-05-14 NOTE — PATIENT INSTRUCTIONS
Stop the losartan.   Start on olmesartan 40 mg daily.   Decrease amlodipine to 5 mg daily.   Start on hydrochlorothiazide 12.5 mg  daily twice daily.     Labs in 3 weeks.   Office visit in 3 weeks.

## 2025-05-14 NOTE — PROGRESS NOTES
Assessment & Plan     Essential hypertension  Current regimen:   Losartan 100 mg daily.  Amlodipine 10 mg daily.   -- having some leg swelling with the medications. Also with some cold intolerance which he attributes to the losartan medication.   -- plan to decrease amlodipine to 5 mg daily to hopefully help with the leg swelling.   -- stop losartan 100 mg daily and switch to olmesartan 40 mg daily.   -- start on hydrochlorothiazide 12.5 mg daily.   -- follow up with repeat labs in 3 weeks, office visit in 3 weeks. Advised to monitor BP at home.   - hydroCHLOROthiazide 12.5 MG tablet  Dispense: 90 tablet; Refill: 3  - Basic metabolic panel  (Ca, Cl, CO2, Creat, Gluc, K, Na, BUN)  - olmesartan (BENICAR) 40 MG tablet  Dispense: 90 tablet; Refill: 3  - amLODIPine (NORVASC) 5 MG tablet  Dispense: 90 tablet; Refill: 3      The risks, benefits and treatment options of prescribed medications or other treatments have been discussed with the patient. The patient verbalized their understanding and should call or follow up if no improvement or if they develop further problems.    The longitudinal plan of care for the diagnosis(es)/condition(s) as documented were addressed during this visit. Due to the added complexity in care, I will continue to support patient in the subsequent management and with ongoing continuity of care.      Kameron Suero is a 40 year old, presenting for the following health issues:  Hypertension      5/14/2025    10:49 AM   Additional Questions   Roomed by Nicole MACHADO   Accompanied by Wife, Mildred     History of Present Illness       Hypertension: He presents for follow up of hypertension.  He does check blood pressure  regularly outside of the clinic. Outside blood pressures have been over 140/90. He follows a low salt diet.     He eats 2-3 servings of fruits and vegetables daily.He consumes 2 sweetened beverage(s) daily.He exercises with enough effort to increase his heart rate 20 to 29 minutes per  "day.  He exercises with enough effort to increase his heart rate 6 days per week.   He is taking medications regularly.          HTN   Losartan 100 mg daily.   Amlodipine 10 mg daily.   Having some leg swelling bilaterally.   States having some issues with the losartan medication and thinks this is the med resulting in his hands feeling cold.   No chest pains or shortness of breath.       Review of Systems  Constitutional, HEENT, cardiovascular, pulmonary, gi and gu systems are negative, except as otherwise noted.      Objective    BP (!) 138/100 (BP Location: Right arm, Patient Position: Chair, Cuff Size: Adult Regular)   Pulse 73   Temp 97.7  F (36.5  C) (Oral)   Resp 18   Ht 1.803 m (5' 11\")   Wt 97.1 kg (214 lb)   SpO2 98%   BMI 29.85 kg/m    Body mass index is 29.85 kg/m .  Physical Exam   General: alert, cooperative, no acute distress   CV: RRR, no murmur  Resp: non-labored breathing, clear to auscultation, no wheezing or rales   Extremities: 1+ pitting edema lower legs.    Signed Electronically by: Noah Smith DO    "

## 2025-06-03 ENCOUNTER — TELEPHONE (OUTPATIENT)
Dept: FAMILY MEDICINE | Facility: CLINIC | Age: 40
End: 2025-06-03
Payer: COMMERCIAL

## 2025-06-03 NOTE — TELEPHONE ENCOUNTER
Patient Quality Outreach    Patient is due for the following:   Hypertension -  Hypertension follow-up visit    Action(s) Taken:   Patient has upcoming appointment, these items will be addressed at that time.    Type of outreach:    Chart review performed, no outreach needed.    Questions for provider review:    None         Laurie Angel MA  Chart routed to None.

## 2025-06-04 ENCOUNTER — OFFICE VISIT (OUTPATIENT)
Dept: FAMILY MEDICINE | Facility: CLINIC | Age: 40
End: 2025-06-04
Payer: COMMERCIAL

## 2025-06-04 ENCOUNTER — LAB (OUTPATIENT)
Dept: LAB | Facility: CLINIC | Age: 40
End: 2025-06-04
Payer: COMMERCIAL

## 2025-06-04 ENCOUNTER — RESULTS FOLLOW-UP (OUTPATIENT)
Dept: FAMILY MEDICINE | Facility: CLINIC | Age: 40
End: 2025-06-04

## 2025-06-04 VITALS
TEMPERATURE: 98 F | SYSTOLIC BLOOD PRESSURE: 132 MMHG | BODY MASS INDEX: 30.24 KG/M2 | WEIGHT: 216 LBS | HEART RATE: 93 BPM | DIASTOLIC BLOOD PRESSURE: 80 MMHG | OXYGEN SATURATION: 96 % | RESPIRATION RATE: 18 BRPM | HEIGHT: 71 IN

## 2025-06-04 DIAGNOSIS — F41.9 ANXIETY: Primary | ICD-10-CM

## 2025-06-04 DIAGNOSIS — I10 ESSENTIAL HYPERTENSION: ICD-10-CM

## 2025-06-04 LAB
ANION GAP SERPL CALCULATED.3IONS-SCNC: 10 MMOL/L (ref 7–15)
BUN SERPL-MCNC: 15.3 MG/DL (ref 6–20)
CALCIUM SERPL-MCNC: 9.5 MG/DL (ref 8.8–10.4)
CHLORIDE SERPL-SCNC: 101 MMOL/L (ref 98–107)
CREAT SERPL-MCNC: 1.32 MG/DL (ref 0.67–1.17)
EGFRCR SERPLBLD CKD-EPI 2021: 70 ML/MIN/1.73M2
GLUCOSE SERPL-MCNC: 137 MG/DL (ref 70–99)
HCO3 SERPL-SCNC: 28 MMOL/L (ref 22–29)
POTASSIUM SERPL-SCNC: 3.8 MMOL/L (ref 3.4–5.3)
SODIUM SERPL-SCNC: 139 MMOL/L (ref 135–145)

## 2025-06-04 PROCEDURE — G2211 COMPLEX E/M VISIT ADD ON: HCPCS | Performed by: FAMILY MEDICINE

## 2025-06-04 PROCEDURE — 1126F AMNT PAIN NOTED NONE PRSNT: CPT | Performed by: FAMILY MEDICINE

## 2025-06-04 PROCEDURE — 36415 COLL VENOUS BLD VENIPUNCTURE: CPT

## 2025-06-04 PROCEDURE — 3079F DIAST BP 80-89 MM HG: CPT | Performed by: FAMILY MEDICINE

## 2025-06-04 PROCEDURE — 3075F SYST BP GE 130 - 139MM HG: CPT | Performed by: FAMILY MEDICINE

## 2025-06-04 PROCEDURE — 80048 BASIC METABOLIC PNL TOTAL CA: CPT

## 2025-06-04 PROCEDURE — 99213 OFFICE O/P EST LOW 20 MIN: CPT | Performed by: FAMILY MEDICINE

## 2025-06-04 ASSESSMENT — PAIN SCALES - GENERAL: PAINLEVEL_OUTOF10: NO PAIN (0)

## 2025-06-04 NOTE — PROGRESS NOTES
"  Assessment & Plan     Essential hypertension  Current regimen:  Amlodipine 5 mg daily.   Hydrochlorothiazide 12.5 mg daily.   Olmesartan 40 mg daily.   -Overall improved.  Blood pressure is within goal range today.  Side effects of cold hands and leg swelling have resolved with medication changes. BMP testing satisfactory today.  No other side effects from medications.  Remains very active with riding bike.  Wishes to have recheck of kidney function in 1 month.  BMP order placed.  - Basic metabolic panel  (Ca, Cl, CO2, Creat, Gluc, K, Na, BUN)    Anxiety  Follows with [facility] psychiatry.  Currently on gabapentin and this is helpful.  He will continue to follow with [facility].      The risks, benefits and treatment options of prescribed medications or other treatments have been discussed with the patient. The patient verbalized their understanding and should call or follow up if no improvement or if they develop further problems.    The longitudinal plan of care for the diagnosis(es)/condition(s) as documented were addressed during this visit. Due to the added complexity in care, I will continue to support patient in the subsequent management and with ongoing continuity of care.        BMI  Estimated body mass index is 30.13 kg/m  as calculated from the following:    Height as of this encounter: 1.803 m (5' 11\").    Weight as of this encounter: 98 kg (216 lb).   Weight management plan: Discussed healthy diet and exercise guidelines      Kameron Suero is a 40 year old, presenting for the following health issues:  Hypertension        6/4/2025    10:43 AM   Additional Questions   Roomed by Nicole MACHADO     History of Present Illness       Hypertension: He presents for follow up of hypertension.  He does check blood pressure  regularly outside of the clinic. Outside blood pressures have been over 140/90. He follows a low salt diet.    He is taking medications regularly.          HTN  Amlodipine 5 mg daily. " "  Hydrochlorothiazide 12.5 mg daily.   Olmesartan 40 mg daily.   Overall doing well on current regimen. Side effects of leg swelling and cold hands have resolved.   Very active with riding bike.   Monitoring BP at home and improved.   BMP today stable.       Follows with [facility]   Using gabapentin for anxiety  On 200 mg in AM, 100 mg at lunch, 100 mg at night.   Helping with anxiety         Review of Systems  Constitutional, HEENT, cardiovascular, pulmonary, gi and gu systems are negative, except as otherwise noted.      Objective    /80   Pulse 93   Temp 98  F (36.7  C) (Oral)   Resp 18   Ht 1.803 m (5' 11\")   Wt 98 kg (216 lb)   SpO2 96%   BMI 30.13 kg/m    Body mass index is 30.13 kg/m .  Physical Exam   General: alert, cooperative, no acute distress   CV: RRR, no murmur  Resp: non-labored breathing, clear to auscultation, no wheezing or rales   Abdomen: Soft, non-tender, no guarding.   Extremities: No peripheral edema, calves non-tender.     Signed Electronically by: Noah Smith DO    "

## 2025-07-17 ENCOUNTER — PATIENT OUTREACH (OUTPATIENT)
Dept: CARE COORDINATION | Facility: CLINIC | Age: 40
End: 2025-07-17
Payer: COMMERCIAL